# Patient Record
Sex: FEMALE | Race: AMERICAN INDIAN OR ALASKA NATIVE | NOT HISPANIC OR LATINO | Employment: UNEMPLOYED | URBAN - METROPOLITAN AREA
[De-identification: names, ages, dates, MRNs, and addresses within clinical notes are randomized per-mention and may not be internally consistent; named-entity substitution may affect disease eponyms.]

---
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
## 2020-04-10 ENCOUNTER — HOSPITAL ENCOUNTER (OUTPATIENT)
Dept: ULTRASOUND IMAGING | Facility: CLINIC | Age: 20
Discharge: HOME | End: 2020-04-10
Attending: FAMILY MEDICINE | Admitting: FAMILY MEDICINE
Payer: MEDICAID

## 2020-04-10 DIAGNOSIS — Z34.01 SUPERVISION OF NORMAL FIRST PREGNANCY IN FIRST TRIMESTER: ICD-10-CM

## 2020-04-10 PROCEDURE — 76801 OB US < 14 WKS SINGLE FETUS: CPT

## 2020-05-27 LAB
C TRACH DNA SPEC QL PROBE+SIG AMP: NORMAL
CULTURE MICRO: NO GROWTH
HBV SURFACE AG SERPL QL IA: NORMAL
HIV 1+2 AB+HIV1 P24 AG SERPL QL IA: NORMAL
N GONORRHOEA DNA SPEC QL PROBE+SIG AMP: NOT DETECTED
RUBELLA ABY IGG: NORMAL
RUBELLA ANTIBODY IGG QUANTITATIVE: <0.9 IU/ML
TREPONEMA ANTIBODIES: NORMAL

## 2020-07-21 ENCOUNTER — HOSPITAL ENCOUNTER (OUTPATIENT)
Dept: ULTRASOUND IMAGING | Facility: CLINIC | Age: 20
Discharge: HOME | End: 2020-07-21
Attending: FAMILY MEDICINE | Admitting: FAMILY MEDICINE
Payer: COMMERCIAL

## 2020-07-21 DIAGNOSIS — O09.92 SUPERVISION OF HIGH-RISK PREGNANCY, SECOND TRIMESTER: ICD-10-CM

## 2020-07-21 PROCEDURE — 76805 OB US >/= 14 WKS SNGL FETUS: CPT

## 2020-08-14 ENCOUNTER — PRE VISIT (OUTPATIENT)
Dept: MATERNAL FETAL MEDICINE | Facility: CLINIC | Age: 20
End: 2020-08-14

## 2020-08-18 ENCOUNTER — OFFICE VISIT (OUTPATIENT)
Dept: MATERNAL FETAL MEDICINE | Facility: CLINIC | Age: 20
End: 2020-08-18
Attending: FAMILY MEDICINE
Payer: COMMERCIAL

## 2020-08-18 ENCOUNTER — HOSPITAL ENCOUNTER (OUTPATIENT)
Dept: ULTRASOUND IMAGING | Facility: CLINIC | Age: 20
End: 2020-08-18
Attending: FAMILY MEDICINE
Payer: COMMERCIAL

## 2020-08-18 DIAGNOSIS — O35.9XX0 SUSPECTED FETAL ANOMALY, ANTEPARTUM, SINGLE OR UNSPECIFIED FETUS: Primary | ICD-10-CM

## 2020-08-18 DIAGNOSIS — O26.90 PREGNANCY RELATED CONDITION, ANTEPARTUM: ICD-10-CM

## 2020-08-18 PROCEDURE — 76811 OB US DETAILED SNGL FETUS: CPT

## 2020-08-18 NOTE — PROGRESS NOTES
Please see ultrasound report under Imaging tab for details of today's ultrasound.    Romi Ceballos MD  Maternal-Fetal Medicine

## 2020-09-16 ENCOUNTER — HOSPITAL ENCOUNTER (OUTPATIENT)
Dept: ULTRASOUND IMAGING | Facility: CLINIC | Age: 20
End: 2020-09-16
Attending: OBSTETRICS & GYNECOLOGY
Payer: COMMERCIAL

## 2020-09-16 ENCOUNTER — OFFICE VISIT (OUTPATIENT)
Dept: MATERNAL FETAL MEDICINE | Facility: CLINIC | Age: 20
End: 2020-09-16
Attending: OBSTETRICS & GYNECOLOGY
Payer: COMMERCIAL

## 2020-09-16 ENCOUNTER — DOCUMENTATION ONLY (OUTPATIENT)
Dept: OTHER | Facility: CLINIC | Age: 20
End: 2020-09-16

## 2020-09-16 DIAGNOSIS — O35.9XX0 SUSPECTED FETAL ANOMALY, ANTEPARTUM, SINGLE OR UNSPECIFIED FETUS: ICD-10-CM

## 2020-09-16 DIAGNOSIS — O99.322 DRUG USE AFFECTING PREGNANCY IN SECOND TRIMESTER: Primary | ICD-10-CM

## 2020-09-16 PROCEDURE — 76816 OB US FOLLOW-UP PER FETUS: CPT

## 2020-09-16 NOTE — PROGRESS NOTES
"Please see \"Imaging\" tab under \"Chart Review\" for details of today's visit.    Elizabeth Nation    "

## 2020-10-07 ENCOUNTER — NURSE TRIAGE (OUTPATIENT)
Dept: NURSING | Facility: CLINIC | Age: 20
End: 2020-10-07

## 2020-10-07 ENCOUNTER — TRANSFERRED RECORDS (OUTPATIENT)
Dept: HEALTH INFORMATION MANAGEMENT | Facility: CLINIC | Age: 20
End: 2020-10-07

## 2020-10-07 NOTE — TELEPHONE ENCOUNTER
Vicenta from [facility] reports pt is 32w gest. UMM 11/24/20. Pt is on Percocet daily and would like to switch to Suboxone. Pt needs to be monitored for induction. Dr. Daxa Power at [facility] is not comfortable doing induction. Requesting provider from [facility] advise.     Deandre Yadav message will be sent to provider. Call back if new concerns arise.     Reason for Disposition    [1] Pregnant AND [2] admits to substance abuse problem    Protocols used: SUBSTANCE ABUSE AND [id number]

## 2020-10-14 ENCOUNTER — OFFICE VISIT (OUTPATIENT)
Dept: MATERNAL FETAL MEDICINE | Facility: CLINIC | Age: 20
End: 2020-10-14
Attending: OBSTETRICS & GYNECOLOGY
Payer: COMMERCIAL

## 2020-10-14 ENCOUNTER — HOSPITAL ENCOUNTER (OUTPATIENT)
Dept: ULTRASOUND IMAGING | Facility: CLINIC | Age: 20
End: 2020-10-14
Attending: OBSTETRICS & GYNECOLOGY
Payer: COMMERCIAL

## 2020-10-14 DIAGNOSIS — O99.322 DRUG USE AFFECTING PREGNANCY IN SECOND TRIMESTER: ICD-10-CM

## 2020-10-14 DIAGNOSIS — O99.323 DRUG USE AFFECTING PREGNANCY IN THIRD TRIMESTER: Primary | ICD-10-CM

## 2020-10-14 PROCEDURE — 76816 OB US FOLLOW-UP PER FETUS: CPT | Mod: 26 | Performed by: OBSTETRICS & GYNECOLOGY

## 2020-10-14 PROCEDURE — 76816 OB US FOLLOW-UP PER FETUS: CPT

## 2020-10-14 NOTE — PROGRESS NOTES
"Please see \"Imaging\" tab under \"Chart Review\" for details of today's US.    Sarah Sagastume, DO  " EXAM note      History    Joyce Smallwood is a 43 year old female who presents with   Chief Complaint   Patient presents with   • Gastro-intestinal Problem        I have reviewed the past medical, family and social history sections including the medications and allergies listed in the above medical record as well as the nursing notes.     HPI:  Patient with 3 day history of nausea with diarrhea.  The most bowel movement she had an 1 day was 3, only had 1 yesterday and today.  No blood in it.  Falls apart when it hits the toilet bowl water, there is no substance to it.  Also complains of some abdominal bloating.  Denies vomiting.  Has not taken any antidiarrheals.    Review of systems    Review of systems as per HPI.        Physical Exam    Vital Signs:    Vitals:    06/02/20 1358   BP: (!) 140/82   Pulse: 82   Resp: 16   Temp: 98.8 °F (37.1 °C)   TempSrc: Temporal   SpO2: 99%   Weight: 89.1 kg   Height: 5' 2\" (1.575 m)   PainSc:  0       General:  Looks well.  Heart:  Regular rate and rhythm without murmurs.  Lungs:  Clear to auscultation.  Abdomen:  Mildly hyperactive bowel sounds.  No hepatosplenomegaly or masses.  No rebound or guarding.     Imaging:  None    Labs:  None    Assessment  Encounter Diagnoses   Name Primary?   • Diarrhea, unspecified type Yes       PLAN    Patient to get Kaopectate or Pepto-Bismol and use as needed.  If this does not work she can try Imodium.  May return to work tomorrow, excuse given.  Follow-up as needed.

## 2020-10-30 ENCOUNTER — HOSPITAL ENCOUNTER (OUTPATIENT)
Dept: ULTRASOUND IMAGING | Facility: CLINIC | Age: 20
End: 2020-10-30
Attending: OBSTETRICS & GYNECOLOGY
Payer: COMMERCIAL

## 2020-10-30 ENCOUNTER — OFFICE VISIT (OUTPATIENT)
Dept: MATERNAL FETAL MEDICINE | Facility: CLINIC | Age: 20
End: 2020-10-30
Attending: OBSTETRICS & GYNECOLOGY
Payer: COMMERCIAL

## 2020-10-30 DIAGNOSIS — O99.323 DRUG USE AFFECTING PREGNANCY IN THIRD TRIMESTER: Primary | ICD-10-CM

## 2020-10-30 DIAGNOSIS — O99.323 DRUG USE AFFECTING PREGNANCY IN THIRD TRIMESTER: ICD-10-CM

## 2020-10-30 PROCEDURE — 99207 PR NO CHARGE LOS: CPT | Performed by: OBSTETRICS & GYNECOLOGY

## 2020-10-30 PROCEDURE — 76819 FETAL BIOPHYS PROFIL W/O NST: CPT

## 2020-10-30 PROCEDURE — 76819 FETAL BIOPHYS PROFIL W/O NST: CPT | Mod: 26 | Performed by: OBSTETRICS & GYNECOLOGY

## 2020-10-30 NOTE — PROGRESS NOTES
"Please see \"Imaging\" tab under \"Chart Review\" for details of today's US.    Sarah Sagastume, DO    "

## 2020-11-11 ENCOUNTER — HOSPITAL ENCOUNTER (OUTPATIENT)
Dept: ULTRASOUND IMAGING | Facility: CLINIC | Age: 20
End: 2020-11-11
Attending: OBSTETRICS & GYNECOLOGY
Payer: COMMERCIAL

## 2020-11-11 ENCOUNTER — OFFICE VISIT (OUTPATIENT)
Dept: MATERNAL FETAL MEDICINE | Facility: CLINIC | Age: 20
End: 2020-11-11
Attending: OBSTETRICS & GYNECOLOGY
Payer: COMMERCIAL

## 2020-11-11 VITALS — DIASTOLIC BLOOD PRESSURE: 74 MMHG | SYSTOLIC BLOOD PRESSURE: 124 MMHG

## 2020-11-11 DIAGNOSIS — O26.90 PREGNANCY RELATED CONDITION, ANTEPARTUM: ICD-10-CM

## 2020-11-11 DIAGNOSIS — O99.323 DRUG USE AFFECTING PREGNANCY IN THIRD TRIMESTER: ICD-10-CM

## 2020-11-11 DIAGNOSIS — O99.323 DRUG USE AFFECTING PREGNANCY IN THIRD TRIMESTER: Primary | ICD-10-CM

## 2020-11-11 LAB
ERYTHROCYTE [DISTWIDTH] IN BLOOD BY AUTOMATED COUNT: 15.8 % (ref 10–15)
HCT VFR BLD AUTO: 35.9 % (ref 35–47)
HGB BLD-MCNC: 11.7 G/DL (ref 11.7–15.7)
MCH RBC QN AUTO: 27.9 PG (ref 26.5–33)
MCHC RBC AUTO-ENTMCNC: 32.6 G/DL (ref 31.5–36.5)
MCV RBC AUTO: 86 FL (ref 78–100)
PLATELET # BLD AUTO: 241 10E9/L (ref 150–450)
RBC # BLD AUTO: 4.19 10E12/L (ref 3.8–5.2)
WBC # BLD AUTO: 10.9 10E9/L (ref 4–11)

## 2020-11-11 PROCEDURE — 84450 TRANSFERASE (AST) (SGOT): CPT | Performed by: OBSTETRICS & GYNECOLOGY

## 2020-11-11 PROCEDURE — 76816 OB US FOLLOW-UP PER FETUS: CPT | Mod: 26 | Performed by: OBSTETRICS & GYNECOLOGY

## 2020-11-11 PROCEDURE — 76819 FETAL BIOPHYS PROFIL W/O NST: CPT

## 2020-11-11 PROCEDURE — 76819 FETAL BIOPHYS PROFIL W/O NST: CPT | Mod: 26 | Performed by: OBSTETRICS & GYNECOLOGY

## 2020-11-11 PROCEDURE — 36415 COLL VENOUS BLD VENIPUNCTURE: CPT | Performed by: OBSTETRICS & GYNECOLOGY

## 2020-11-11 PROCEDURE — 84156 ASSAY OF PROTEIN URINE: CPT | Performed by: OBSTETRICS & GYNECOLOGY

## 2020-11-11 PROCEDURE — 82565 ASSAY OF CREATININE: CPT | Performed by: OBSTETRICS & GYNECOLOGY

## 2020-11-11 PROCEDURE — 84460 ALANINE AMINO (ALT) (SGPT): CPT | Performed by: OBSTETRICS & GYNECOLOGY

## 2020-11-11 PROCEDURE — 85027 COMPLETE CBC AUTOMATED: CPT | Performed by: OBSTETRICS & GYNECOLOGY

## 2020-11-11 NOTE — NURSING NOTE
Blood pressure checked per Dr. Kraus's request. 124/74.  Patient sent up to 7th floor for labs.  Patient instructed on signs and symptoms of preeclampsia and when to call her primary ob provider. Patient verbalized understanding of these instructions.

## 2020-11-11 NOTE — PROGRESS NOTES
Please see ultrasound report under imaging tab for details on ultrasound performed today.    Jessica Kraus MD  , OB/GYN  Maternal-Fetal Medicine  [email or web address]  [phone] (Academic office)  [phone] (Pager)

## 2020-11-12 LAB
ALT SERPL W P-5'-P-CCNC: 10 U/L (ref 0–50)
AST SERPL W P-5'-P-CCNC: 9 U/L (ref 0–45)
CREAT SERPL-MCNC: 0.48 MG/DL (ref 0.52–1.04)
CREAT UR-MCNC: 184 MG/DL
GFR SERPL CREATININE-BSD FRML MDRD: >90 ML/MIN/{1.73_M2}
PROT UR-MCNC: 0.46 G/L
PROT/CREAT 24H UR: 0.25 G/G CR (ref 0–0.2)

## 2020-11-18 ENCOUNTER — HOSPITAL ENCOUNTER (INPATIENT)
Facility: CLINIC | Age: 20
LOS: 3 days | Discharge: HOME HEALTH | End: 2020-11-21
Attending: ADVANCED PRACTICE MIDWIFE | Admitting: ADVANCED PRACTICE MIDWIFE
Payer: COMMERCIAL

## 2020-11-18 DIAGNOSIS — D62 ANEMIA DUE TO BLOOD LOSS, ACUTE: ICD-10-CM

## 2020-11-18 PROBLEM — O42.90 LEAKAGE OF AMNIOTIC FLUID: Status: ACTIVE | Noted: 2020-11-18

## 2020-11-18 PROBLEM — Z36.89 ENCOUNTER FOR TRIAGE IN PREGNANT PATIENT: Status: ACTIVE | Noted: 2020-11-18

## 2020-11-18 LAB
ABO + RH BLD: NORMAL
ABO + RH BLD: NORMAL
AMPHETAMINES UR QL SCN: NEGATIVE
BASOPHILS # BLD AUTO: 0 10E9/L (ref 0–0.2)
BASOPHILS NFR BLD AUTO: 0.3 %
BLD GP AB SCN SERPL QL: NORMAL
BLOOD BANK CMNT PATIENT-IMP: NORMAL
CANNABINOIDS UR QL: NEGATIVE
COCAINE UR QL: NEGATIVE
DIFFERENTIAL METHOD BLD: ABNORMAL
EOSINOPHIL # BLD AUTO: 0.1 10E9/L (ref 0–0.7)
EOSINOPHIL NFR BLD AUTO: 0.7 %
ERYTHROCYTE [DISTWIDTH] IN BLOOD BY AUTOMATED COUNT: 15.3 % (ref 10–15)
HCT VFR BLD AUTO: 37.8 % (ref 35–47)
HGB BLD-MCNC: 12.1 G/DL (ref 11.7–15.7)
IMM GRANULOCYTES # BLD: 0.1 10E9/L (ref 0–0.4)
IMM GRANULOCYTES NFR BLD: 0.6 %
LABORATORY COMMENT REPORT: NORMAL
LYMPHOCYTES # BLD AUTO: 1.9 10E9/L (ref 0.8–5.3)
LYMPHOCYTES NFR BLD AUTO: 19 %
MCH RBC QN AUTO: 27.6 PG (ref 26.5–33)
MCHC RBC AUTO-ENTMCNC: 32 G/DL (ref 31.5–36.5)
MCV RBC AUTO: 86 FL (ref 78–100)
MONOCYTES # BLD AUTO: 0.4 10E9/L (ref 0–1.3)
MONOCYTES NFR BLD AUTO: 4.1 %
NEUTROPHILS # BLD AUTO: 7.4 10E9/L (ref 1.6–8.3)
NEUTROPHILS NFR BLD AUTO: 75.3 %
NRBC # BLD AUTO: 0 10*3/UL
NRBC BLD AUTO-RTO: 0 /100
OPIATES UR QL SCN: NEGATIVE
PCP UR QL SCN: NEGATIVE
PLATELET # BLD AUTO: 233 10E9/L (ref 150–450)
RBC # BLD AUTO: 4.38 10E12/L (ref 3.8–5.2)
RUPTURE OF FETAL MEMBRANES BY ROM PLUS: POSITIVE
SARS-COV-2 RNA SPEC QL NAA+PROBE: NEGATIVE
SARS-COV-2 RNA SPEC QL NAA+PROBE: NORMAL
SPECIMEN EXP DATE BLD: NORMAL
SPECIMEN SOURCE: NORMAL
SPECIMEN SOURCE: NORMAL
WBC # BLD AUTO: 9.8 10E9/L (ref 4–11)

## 2020-11-18 PROCEDURE — G0463 HOSPITAL OUTPT CLINIC VISIT: HCPCS

## 2020-11-18 PROCEDURE — 36415 COLL VENOUS BLD VENIPUNCTURE: CPT | Performed by: ADVANCED PRACTICE MIDWIFE

## 2020-11-18 PROCEDURE — 250N000013 HC RX MED GY IP 250 OP 250 PS 637

## 2020-11-18 PROCEDURE — 85025 COMPLETE CBC W/AUTO DIFF WBC: CPT | Performed by: ADVANCED PRACTICE MIDWIFE

## 2020-11-18 PROCEDURE — 86780 TREPONEMA PALLIDUM: CPT | Performed by: ADVANCED PRACTICE MIDWIFE

## 2020-11-18 PROCEDURE — 86901 BLOOD TYPING SEROLOGIC RH(D): CPT | Performed by: ADVANCED PRACTICE MIDWIFE

## 2020-11-18 PROCEDURE — 250N000011 HC RX IP 250 OP 636: Performed by: ADVANCED PRACTICE MIDWIFE

## 2020-11-18 PROCEDURE — U0003 INFECTIOUS AGENT DETECTION BY NUCLEIC ACID (DNA OR RNA); SEVERE ACUTE RESPIRATORY SYNDROME CORONAVIRUS 2 (SARS-COV-2) (CORONAVIRUS DISEASE [COVID-19]), AMPLIFIED PROBE TECHNIQUE, MAKING USE OF HIGH THROUGHPUT TECHNOLOGIES AS DESCRIBED BY CMS-2020-01-R: HCPCS | Performed by: ADVANCED PRACTICE MIDWIFE

## 2020-11-18 PROCEDURE — 250N000013 HC RX MED GY IP 250 OP 250 PS 637: Performed by: ADVANCED PRACTICE MIDWIFE

## 2020-11-18 PROCEDURE — 86900 BLOOD TYPING SEROLOGIC ABO: CPT | Performed by: ADVANCED PRACTICE MIDWIFE

## 2020-11-18 PROCEDURE — 84112 EVAL AMNIOTIC FLUID PROTEIN: CPT | Performed by: OBSTETRICS & GYNECOLOGY

## 2020-11-18 PROCEDURE — 120N000002 HC R&B MED SURG/OB UMMC

## 2020-11-18 PROCEDURE — 87653 STREP B DNA AMP PROBE: CPT | Performed by: ADVANCED PRACTICE MIDWIFE

## 2020-11-18 PROCEDURE — 86850 RBC ANTIBODY SCREEN: CPT | Performed by: ADVANCED PRACTICE MIDWIFE

## 2020-11-18 PROCEDURE — 80307 DRUG TEST PRSMV CHEM ANLYZR: CPT | Performed by: ADVANCED PRACTICE MIDWIFE

## 2020-11-18 PROCEDURE — 258N000003 HC RX IP 258 OP 636: Performed by: ADVANCED PRACTICE MIDWIFE

## 2020-11-18 RX ORDER — IBUPROFEN 800 MG/1
800 TABLET, FILM COATED ORAL
Status: DISCONTINUED | OUTPATIENT
Start: 2020-11-18 | End: 2020-11-19

## 2020-11-18 RX ORDER — NALOXONE HYDROCHLORIDE 0.4 MG/ML
0.4 INJECTION, SOLUTION INTRAMUSCULAR; INTRAVENOUS; SUBCUTANEOUS
Status: DISCONTINUED | OUTPATIENT
Start: 2020-11-18 | End: 2020-11-21 | Stop reason: HOSPADM

## 2020-11-18 RX ORDER — PENICILLIN G POTASSIUM 5000000 [IU]/1
5 INJECTION, POWDER, FOR SOLUTION INTRAMUSCULAR; INTRAVENOUS ONCE
Status: COMPLETED | OUTPATIENT
Start: 2020-11-18 | End: 2020-11-18

## 2020-11-18 RX ORDER — ONDANSETRON 2 MG/ML
4 INJECTION INTRAMUSCULAR; INTRAVENOUS EVERY 6 HOURS PRN
Status: DISCONTINUED | OUTPATIENT
Start: 2020-11-18 | End: 2020-11-19

## 2020-11-18 RX ORDER — OXYTOCIN 10 [USP'U]/ML
10 INJECTION, SOLUTION INTRAMUSCULAR; INTRAVENOUS
Status: DISCONTINUED | OUTPATIENT
Start: 2020-11-18 | End: 2020-11-19

## 2020-11-18 RX ORDER — MISOPROSTOL 100 UG/1
25 TABLET ORAL
Status: DISCONTINUED | OUTPATIENT
Start: 2020-11-18 | End: 2020-11-19

## 2020-11-18 RX ORDER — CARBOPROST TROMETHAMINE 250 UG/ML
250 INJECTION, SOLUTION INTRAMUSCULAR
Status: DISCONTINUED | OUTPATIENT
Start: 2020-11-18 | End: 2020-11-19

## 2020-11-18 RX ORDER — FENTANYL CITRATE 50 UG/ML
50-100 INJECTION, SOLUTION INTRAMUSCULAR; INTRAVENOUS
Status: DISCONTINUED | OUTPATIENT
Start: 2020-11-18 | End: 2020-11-19

## 2020-11-18 RX ORDER — METHYLERGONOVINE MALEATE 0.2 MG/ML
200 INJECTION INTRAVENOUS
Status: DISCONTINUED | OUTPATIENT
Start: 2020-11-18 | End: 2020-11-19

## 2020-11-18 RX ORDER — ACETAMINOPHEN 325 MG/1
650 TABLET ORAL EVERY 4 HOURS PRN
Status: DISCONTINUED | OUTPATIENT
Start: 2020-11-18 | End: 2020-11-19

## 2020-11-18 RX ORDER — NALOXONE HYDROCHLORIDE 0.4 MG/ML
0.2 INJECTION, SOLUTION INTRAMUSCULAR; INTRAVENOUS; SUBCUTANEOUS
Status: DISCONTINUED | OUTPATIENT
Start: 2020-11-18 | End: 2020-11-21 | Stop reason: HOSPADM

## 2020-11-18 RX ORDER — OXYTOCIN/0.9 % SODIUM CHLORIDE 30/500 ML
100-340 PLASTIC BAG, INJECTION (ML) INTRAVENOUS CONTINUOUS PRN
Status: COMPLETED | OUTPATIENT
Start: 2020-11-18 | End: 2020-11-19

## 2020-11-18 RX ORDER — CITRIC ACID/SODIUM CITRATE 334-500MG
30 SOLUTION, ORAL ORAL ONCE
Status: DISCONTINUED | OUTPATIENT
Start: 2020-11-18 | End: 2020-11-19

## 2020-11-18 RX ORDER — MISOPROSTOL 200 UG/1
TABLET ORAL
Status: COMPLETED
Start: 2020-11-18 | End: 2020-11-18

## 2020-11-18 RX ORDER — HYDROXYZINE HYDROCHLORIDE 50 MG/1
50-100 TABLET, FILM COATED ORAL
Status: DISCONTINUED | OUTPATIENT
Start: 2020-11-18 | End: 2020-11-19

## 2020-11-18 RX ORDER — NALOXONE HYDROCHLORIDE 0.4 MG/ML
.1-.4 INJECTION, SOLUTION INTRAMUSCULAR; INTRAVENOUS; SUBCUTANEOUS
Status: DISCONTINUED | OUTPATIENT
Start: 2020-11-18 | End: 2020-11-18

## 2020-11-18 RX ORDER — OXYCODONE AND ACETAMINOPHEN 5; 325 MG/1; MG/1
1 TABLET ORAL
Status: DISCONTINUED | OUTPATIENT
Start: 2020-11-18 | End: 2020-11-19

## 2020-11-18 RX ORDER — LIDOCAINE 40 MG/G
CREAM TOPICAL
Status: DISCONTINUED | OUTPATIENT
Start: 2020-11-18 | End: 2020-11-19

## 2020-11-18 RX ORDER — HYDROXYZINE HYDROCHLORIDE 50 MG/1
100 TABLET, FILM COATED ORAL EVERY 6 HOURS PRN
Status: DISCONTINUED | OUTPATIENT
Start: 2020-11-18 | End: 2020-11-19

## 2020-11-18 RX ORDER — LIDOCAINE HYDROCHLORIDE 10 MG/ML
INJECTION, SOLUTION EPIDURAL; INFILTRATION; INTRACAUDAL; PERINEURAL
Status: DISCONTINUED
Start: 2020-11-18 | End: 2020-11-19 | Stop reason: WASHOUT

## 2020-11-18 RX ADMIN — Medication 2.5 MILLION UNITS: at 23:47

## 2020-11-18 RX ADMIN — Medication 25 MCG: at 21:37

## 2020-11-18 RX ADMIN — HYDROXYZINE HYDROCHLORIDE 100 MG: 50 TABLET, FILM COATED ORAL at 21:47

## 2020-11-18 RX ADMIN — Medication 25 MCG: at 15:25

## 2020-11-18 RX ADMIN — PENICILLIN G POTASSIUM 5 MILLION UNITS: 5000000 POWDER, FOR SOLUTION INTRAMUSCULAR; INTRAPLEURAL; INTRATHECAL; INTRAVENOUS at 19:40

## 2020-11-18 RX ADMIN — Medication 25 MCG: at 19:33

## 2020-11-18 RX ADMIN — Medication 25 MCG: at 17:25

## 2020-11-18 ASSESSMENT — ACTIVITIES OF DAILY LIVING (ADL)
TOILETING_ISSUES: NO
FALL_HISTORY_WITHIN_LAST_SIX_MONTHS: YES
NUMBER_OF_TIMES_PATIENT_HAS_FALLEN_WITHIN_LAST_SIX_MONTHS: 1

## 2020-11-18 NOTE — PROVIDER NOTIFICATION
11/18/20 1121   Provider Notification   Provider Name/Title Yulissa Chaves CNM   Method of Notification Electronic Page   ROM+ positive.  Per prenatal, utox is needed. Need covid.  GBS unknown.

## 2020-11-18 NOTE — PROGRESS NOTES
Labor progress note    S: Pt has been coping well, not really feeling much cramping.     O:  Blood pressure 119/60, pulse 85, temperature 98.2  F (36.8  C), temperature source Oral, resp. rate 16, last menstrual period 2020, not currently breastfeeding.  General appearance: comfortable.  Contractions: irregular, mild.  Soft resting tone.   FHT: Baseline 135 with moderate variability. Accelerations are present. no decelerations present.  Periods of loss of contact, EFM readjusted.   ROM: clear fluid. Membranes have been ruptured for ~16 hours.  PELVIC EXAM:deferred  Sanford Score: Total:  with admit SVE    Pitocin- none,  Antibiotics- Plans PCN for GBS unknown at term at ~18 hours ROM  IV saline lock    Utox returned negative        # Pain Assessment:    Polina sol pain level was assessed and she currently denies pain.        A:  20 year old  with IUP @ 39w1d IOL for PROM without labor   ROM <18 hours, clear, afebrile  Fetal Heart Rate Tracing category one  GBS- pending. Plan RX at 18 hours of ROM    Patient Active Problem List   Diagnosis     Major depression     Suicidal ideation     Encounter for triage in pregnant patient     Leakage of amniotic fluid         P:  Ambulation, hydration, position changes, birthing ball/sling and tub options to facilitate labor.   Continue cervical ripening with oral Misoprostol per protocol.  Consider vaginal route prn   Prophylactic IV antibiotic for unknown GBS status  At term, pt agreeable to PCN at 18 hours of ROM.   Reevaluate in 2-4 4 hours and prn    Yulissa JAMIL, CNM
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
## 2020-11-18 NOTE — PROVIDER NOTIFICATION
20 1013   Provider Notification   Provider Name/Title Dr Barton   Method of Notification Electronic Page   Request Evaluate in Person   Notification Reason Patient Arrived    39.1 leaking fluid since 0100- clear per pt.  Not painful or regular ctxs.  No vag bldg. +FM.  Unknown GBS. I will collect ROM+

## 2020-11-18 NOTE — H&P
ADMIT NOTE  =================  39w1d    Polina Barrientos is a 20 year old female with an Patient's last menstrual period was 2020. and Estimated Date of Delivery: 2020 [facility] at 11:40 AM with SROM without labor.     HPI  ================  Pt reports leaking clear fluid since 0100 today.    No fever, cough, SOB or chest pain. No contact with anyone who is Covid-19 positive. Agreeable to Covid-19 testing. She and her partner are wearing masks.   Contractions- none  Fetal movement- active  ROM- yes moderate, clear. SROM @ 0100.  Vaginal bleeding- none  GBS- pending  FOB- is involved, Herve, present and supportive  Other labor support- None given COVID restrictions    Weight gain- 218 - 209 lbs, Total weight gain- 9 lbs *as of prenatal appointment 10/5/20*  Height- 65.2in  BMI- 37  First prenatal visit at 11 weeks (had first dating US at 7 weeks), Total visits- 6    - Partner Herve  - Getting prenatal care at [facility], faxed prenatals.     - Tdap and Flu 10/5/2020   - Plans pap postpartum  - Opioid Use disorder noted in prenatal with on/off use of Suboxone without prescription. Declined initiation   - history of depression with suicide attempts , , Stopped Wellbutrin declined talk therapy  - cigarette smoker  - Had Pre E labs checked 2020 at [facility]. BP was 124/79 but had RUQ pain, no HA or visual changes.  Labs WNL (CBC, AST/ALT, Pr/Cr ratio)  - Having weekly BPP for opioid use disorder/maintenance   - 2020 US at [facility] - Cephalic.  No previa.  39%tile. MVP 5.9cm. BPP     Dating US 4/10/2020 - 7w3d  Anatomy scan 2020 with [facility], BPD smaller than AC so [facility] US ordered.   Had [facility] US on , , 10/14,    Use [facility] by 7w3d US - 2020    PROBLEM LIST  =================  Patient Active Problem List    Diagnosis Date Noted     Encounter for triage in pregnant patient 2020     Priority: Medium     Leakage of  amniotic fluid 2020     Priority: Medium     Suicidal ideation 10/16/2015     Priority: Medium     Major depression 2013     Priority: Medium       HISTORIES  ============  No Known Allergies  Past Medical History:   Diagnosis Date     Asthma     does not use inhaler, dx'd      Depressive disorder     h/o depression and suidide attempt      History reviewed. No pertinent surgical history..  History reviewed. No pertinent family history.  Social History     Tobacco Use     Smoking status: Never Smoker     Smokeless tobacco: Never Used   Substance Use Topics     Alcohol use: Not Currently     Comment: occasionally     OB History    Para Term  AB Living   1 0 0 0 0 0   SAB TAB Ectopic Multiple Live Births   0 0 0 0 0      # Outcome Date GA Lbr Marco Antonio/2nd Weight Sex Delivery Anes PTL Lv   1 Current                 LABS:   ===========  Prenatal Labs:  2020 - HIV NR  2020 - Hep B antibody BORDERLINE  2020 - Hep C NR  2020 - HbsAg NR  2020 - Rubella NON immune   -  O positive, antibody negative  2020- RPR NR  2020 - GC/CT negative  2020 - Urine culture negative  2020 -  Utox positive Oxycodone.   2020 - HgA1c 5  2020 - Hg 13.3, Hct40.9, Plt 237    2020 - Passed 1 hour = 94  2020 - Hg 11.5, hematocrit 36.8, Plt 248    Rhogam not indicated   Lab Results   Component Value Date    ABO O 2020    RH Pos 2020    AS Neg 2020    RUQIGG <0.90 2020    HEPBANG non-reactive 2020    HGB 12.1 2020    HIAGAB non-reactive 2020     Rubella NON immune  GBS - unknown   Other labs:  ROM plus POSITIVE    ROS  =========  Pt denies significant respiratory, cardiovacular, GI, or muscular/skeletalcomplaints.    See RN data base ROS.       PHYSICAL EXAM:  ===============  /68   Pulse 83   Temp 98.1  F (36.7  C) (Oral)   Resp 16   LMP 2020   General appearance: comfortable  GENERAL  APPEARANCE: healthy, alert and no distress  RESP: lungs clear to auscultation - no rales, rhonchi or wheezes  CV: regular rates and rhythm, normal S1 S2, no S3 or S4 and no murmur,and no varicosities  ABDOMEN:  soft, nontender, no epigastric pain  SKIN: no suspicious lesions or rashes  NEURO: Denies headache, blurred vision, other vision changes  PSYCH: mentation appears normal. and affect normal/bright  Legs: No edema     Abdomen: gravid, vertex fetus per Leopold's, non-tender between contractions.   Cephalic presentation confirmed by BSUS  EFW-  7.5 lbs.   CONTRACTIONS: irreg, mild and cramping  FETAL HEART TONES: continuous EFM- baseline 140 with moderate variability and positive accelerations. No decelerations.  PELVIC EXAM: closed/ 30%/ Posterior/ soft/ -3   ESPINOSA SCORE: 2  BLOODY SHOW: no   ROM:no  FLUID: clear  ROMPLUS: positive    # Pain Assessment:  Current Pain Score 2020   Patient currently in pain? denies   Pain score (0-10) -   Pain location -   Pain descriptors -   Polina sol pain level was assessed and she currently denies pain.       ASSESSMENT:  ==============  20 year old  with IUP @ 39w1d admitted with PROM without labor   NST REACTIVE  Fetal Heart Tones - category one  GBS- pending  Covid- pending    Patient Active Problem List   Diagnosis     Major depression     Suicidal ideation     Encounter for triage in pregnant patient     Leakage of amniotic fluid       PLAN:  ===========  - Admit - see IP orders  - Discussed GBS unknown status, GBS specimen obtained on admission and is pending at this time.  Risks and benefits to antibiotics discussed.  Patient desires to initiate treatment 18 hours after rupture at 1900 this evening.  - Discussed active vs expected management of labor, risk of infection increases after 24 hours ROM.  Discussed minimizing cervical exams to decrease risk of infection.  Patient desires expectant management until 12 hours ROM at 1300.  Is amenable to SVE and to  discuss active management at that time.  Discussed risks and benefits to cervical ripening options including misoprostol, lozano balloon, and pitocin.  Patient consents to IV placement.  Desires to ambulate in room and use birthing ball at this time, discussed nipple stimulation to promote oxytocin release.  Will revisit at 1300.  - Pain medication options reviewed including nitrous oxide (pending negative COVID test), fentayl, and epidural. Pt is interested in natural labor, declines pain medications at this time.      I, Jessica Yan, am serving as a scribe; to document services personally performed by  OMERO Blair, KEVIN based on data collection and the provider's statements to me.     Jessica Yan, RN, SNM    The encounter was performed by me and scribed by the SNM. The scribed note accurately reflects my personal services and decisions made by me. OMERO Dacosta CNM      ADDENDUM 11/18/2020 at 1500  S - Pt feeling some cramping but nothing painful.  Agreeable to Utox, will leave sample with void before SVE.  Had pt alone, confirmed feels supported and safe with partner Herve.  Reviewed again risk/benefit of SVE given ROM without labor.  Pt desires SVE for plan of care.    O -  /68   Pulse 83   Temp 98.1  F (36.7  C) (Oral)   Resp 16   LMP 01/16/2020    FHR: 125bpm baseline, moderate variability, +Accels, No Decels.  Periods of loss of contact   TOCO: occasional mild contractions.  Soft resting tone  Cephalic confirmed by BSUS in transverse abdominal view  SVE 0/30/-3, posterior, soft  Sanford score 2    IV saline lock    Results for orders placed or performed during the hospital encounter of 11/18/20   Asymptomatic COVID-19 Virus (Coronavirus) by PCR     Status: None    Specimen: Nasopharyngeal   Result Value Ref Range    COVID-19 Virus PCR to [facility] - Source Nasopharyngeal     COVID-19 Virus PCR to [facility] - Result       Test received-See reflex to IDDL test SARS CoV2 (COVID-19) Virus  RT-PCR   CBC with platelets differential     Status: Abnormal   Result Value Ref Range    WBC 9.8 4.0 - 11.0 10e9/L    RBC Count 4.38 3.8 - 5.2 10e12/L    Hemoglobin 12.1 11.7 - 15.7 g/dL    Hematocrit 37.8 35.0 - 47.0 %    MCV 86 78 - 100 fl    MCH 27.6 26.5 - 33.0 pg    MCHC 32.0 31.5 - 36.5 g/dL    RDW 15.3 (H) 10.0 - 15.0 %    Platelet Count 233 150 - 450 10e9/L    Diff Method Automated Method     % Neutrophils 75.3 %    % Lymphocytes 19.0 %    % Monocytes 4.1 %    % Eosinophils 0.7 %    % Basophils 0.3 %    % Immature Granulocytes 0.6 %    Nucleated RBCs 0 0 /100    Absolute Neutrophil 7.4 1.6 - 8.3 10e9/L    Absolute Lymphocytes 1.9 0.8 - 5.3 10e9/L    Absolute Monocytes 0.4 0.0 - 1.3 10e9/L    Absolute Eosinophils 0.1 0.0 - 0.7 10e9/L    Absolute Basophils 0.0 0.0 - 0.2 10e9/L    Abs Immature Granulocytes 0.1 0 - 0.4 10e9/L    Absolute Nucleated RBC 0.0    Drug Screen Urine /     Status: None   Result Value Ref Range    Amphetamine Qual Urine Negative NEG^Negative    Cannabinoids Qual Urine Negative NEG^Negative    Cocaine Qual Urine Negative NEG^Negative    Opiates Qualitative Urine Negative NEG^Negative    Pcp Qual Urine Negative NEG^Negative   SARS-CoV-2 COVID-19 Virus (Coronavirus) RT-PCR Nasopharyngeal     Status: None    Specimen: Nasopharyngeal   Result Value Ref Range    SARS-CoV-2 Virus Specimen Source Nasopharyngeal     SARS-CoV-2 PCR Result NEGATIVE     SARS-CoV-2 PCR Comment       Testing was performed using the Xpert Xpress SARS-CoV-2 Assay on the Cepheid Gene-Xpert   Instrument Systems. Additional information about this Emergency Use Authorization (EUA)   assay can be found via the Lab Guide.     Hepatitis B surface antigen     Status: None   Result Value Ref Range    Hep B Surface Agn non-reactive    Treponema Abs w Reflex to RPR and Titer     Status: None   Result Value Ref Range    Treponema Antibodies RPR non-reactive    HIV Antigen Antibody Combo     Status: None   Result  Value Ref Range    HIV Antigen Antibody Combo non-reactive    Rubella Antibody IgG Quantitative     Status: None   Result Value Ref Range    Rubella KAYLIN IgG not immune     Rubella Antibody IgG Quantitative <0.90 IU/mL   ABO/Rh type and screen     Status: None   Result Value Ref Range    ABO O     RH(D) Pos     Antibody Screen Neg     Test Valid Only At          [facility]    Specimen Expires 2020    Rupture of Fetal Membranes by ROM Plus     Status: Abnormal   Result Value Ref Range    Rupture of Fetal Membranes by ROM Plus Positive (A) NEG^Negative   Urine Culture Aerobic Bacterial     Status: None    Specimen: Urine   Result Value Ref Range    Culture Micro no growth    Chlamydia trachomatis PCR     Status: None   Result Value Ref Range    Chlamydia Trachomatis PCR not dectected    Neisseria gonorrhoeae PCR     Status: None   Result Value Ref Range    N Gonorrhea PCR not detected            A: 20 year old  at 39w1d  PROM without labor <18 hours  GBS unknown    P : Continue plan as above.   Cervical ripening options again reivewed with patient including risk/benefit.  Pt prefers to proceed with oral Misoprostol.   Utox in process   Reassess 4 hours and prn.      I, Jessica Yan, am serving as a scribe; to document services personally performed by  OMERO Blair, KEVIN based on data collection and the provider's statements to me.     Jessica Yan, RN, SNM    The encounter was performed by me and scribed by the SNM. The scribed note accurately reflects my personal services and decisions made by me. OMERO Dacosta CNM

## 2020-11-19 ENCOUNTER — ANESTHESIA EVENT (OUTPATIENT)
Dept: OBGYN | Facility: CLINIC | Age: 20
End: 2020-11-19
Payer: COMMERCIAL

## 2020-11-19 ENCOUNTER — ANESTHESIA (OUTPATIENT)
Dept: OBGYN | Facility: CLINIC | Age: 20
End: 2020-11-19
Payer: COMMERCIAL

## 2020-11-19 LAB
GP B STREP DNA SPEC QL NAA+PROBE: NEGATIVE
SPECIMEN SOURCE: NORMAL
T PALLIDUM AB SER QL: NONREACTIVE

## 2020-11-19 PROCEDURE — 250N000009 HC RX 250: Performed by: ADVANCED PRACTICE MIDWIFE

## 2020-11-19 PROCEDURE — 258N000003 HC RX IP 258 OP 636

## 2020-11-19 PROCEDURE — 250N000013 HC RX MED GY IP 250 OP 250 PS 637: Performed by: ADVANCED PRACTICE MIDWIFE

## 2020-11-19 PROCEDURE — 88307 TISSUE EXAM BY PATHOLOGIST: CPT | Mod: TC | Performed by: ADVANCED PRACTICE MIDWIFE

## 2020-11-19 PROCEDURE — 59409 OBSTETRICAL CARE: CPT | Performed by: ADVANCED PRACTICE MIDWIFE

## 2020-11-19 PROCEDURE — 250N000011 HC RX IP 250 OP 636: Performed by: STUDENT IN AN ORGANIZED HEALTH CARE EDUCATION/TRAINING PROGRAM

## 2020-11-19 PROCEDURE — 88307 TISSUE EXAM BY PATHOLOGIST: CPT | Mod: 26 | Performed by: PATHOLOGY

## 2020-11-19 PROCEDURE — 10S0XZZ REPOSITION PRODUCTS OF CONCEPTION, EXTERNAL APPROACH: ICD-10-PCS | Performed by: ADVANCED PRACTICE MIDWIFE

## 2020-11-19 PROCEDURE — 3E0R3BZ INTRODUCTION OF ANESTHETIC AGENT INTO SPINAL CANAL, PERCUTANEOUS APPROACH: ICD-10-PCS | Performed by: ANESTHESIOLOGY

## 2020-11-19 PROCEDURE — 722N000001 HC LABOR CARE VAGINAL DELIVERY SINGLE

## 2020-11-19 PROCEDURE — 120N000002 HC R&B MED SURG/OB UMMC

## 2020-11-19 PROCEDURE — 258N000003 HC RX IP 258 OP 636: Performed by: STUDENT IN AN ORGANIZED HEALTH CARE EDUCATION/TRAINING PROGRAM

## 2020-11-19 PROCEDURE — 250N000009 HC RX 250: Performed by: STUDENT IN AN ORGANIZED HEALTH CARE EDUCATION/TRAINING PROGRAM

## 2020-11-19 PROCEDURE — 250N000011 HC RX IP 250 OP 636: Performed by: ADVANCED PRACTICE MIDWIFE

## 2020-11-19 PROCEDURE — 00HU33Z INSERTION OF INFUSION DEVICE INTO SPINAL CANAL, PERCUTANEOUS APPROACH: ICD-10-PCS | Performed by: ANESTHESIOLOGY

## 2020-11-19 PROCEDURE — 258N000003 HC RX IP 258 OP 636: Performed by: ADVANCED PRACTICE MIDWIFE

## 2020-11-19 RX ORDER — AMOXICILLIN 250 MG
1 CAPSULE ORAL 2 TIMES DAILY
Status: DISCONTINUED | OUTPATIENT
Start: 2020-11-19 | End: 2020-11-21 | Stop reason: HOSPADM

## 2020-11-19 RX ORDER — LIDOCAINE HYDROCHLORIDE AND EPINEPHRINE 15; 5 MG/ML; UG/ML
INJECTION, SOLUTION EPIDURAL PRN
Status: DISCONTINUED | OUTPATIENT
Start: 2020-11-19 | End: 2020-11-21 | Stop reason: HOSPADM

## 2020-11-19 RX ORDER — MODIFIED LANOLIN
OINTMENT (GRAM) TOPICAL
Status: DISCONTINUED | OUTPATIENT
Start: 2020-11-19 | End: 2020-11-21 | Stop reason: HOSPADM

## 2020-11-19 RX ORDER — IBUPROFEN 800 MG/1
800 TABLET, FILM COATED ORAL EVERY 6 HOURS PRN
Status: DISCONTINUED | OUTPATIENT
Start: 2020-11-19 | End: 2020-11-21 | Stop reason: HOSPADM

## 2020-11-19 RX ORDER — OXYTOCIN/0.9 % SODIUM CHLORIDE 30/500 ML
1-24 PLASTIC BAG, INJECTION (ML) INTRAVENOUS CONTINUOUS
Status: DISCONTINUED | OUTPATIENT
Start: 2020-11-19 | End: 2020-11-19

## 2020-11-19 RX ORDER — SODIUM CHLORIDE, SODIUM LACTATE, POTASSIUM CHLORIDE, CALCIUM CHLORIDE 600; 310; 30; 20 MG/100ML; MG/100ML; MG/100ML; MG/100ML
INJECTION, SOLUTION INTRAVENOUS CONTINUOUS
Status: DISCONTINUED | OUTPATIENT
Start: 2020-11-19 | End: 2020-11-19

## 2020-11-19 RX ORDER — OXYTOCIN 10 [USP'U]/ML
10 INJECTION, SOLUTION INTRAMUSCULAR; INTRAVENOUS
Status: DISCONTINUED | OUTPATIENT
Start: 2020-11-19 | End: 2020-11-21 | Stop reason: HOSPADM

## 2020-11-19 RX ORDER — HYDROCORTISONE 2.5 %
CREAM (GRAM) TOPICAL 3 TIMES DAILY PRN
Status: DISCONTINUED | OUTPATIENT
Start: 2020-11-19 | End: 2020-11-21 | Stop reason: HOSPADM

## 2020-11-19 RX ORDER — BISACODYL 10 MG
10 SUPPOSITORY, RECTAL RECTAL DAILY PRN
Status: DISCONTINUED | OUTPATIENT
Start: 2020-11-21 | End: 2020-11-21 | Stop reason: HOSPADM

## 2020-11-19 RX ORDER — SODIUM CHLORIDE, SODIUM LACTATE, POTASSIUM CHLORIDE, CALCIUM CHLORIDE 600; 310; 30; 20 MG/100ML; MG/100ML; MG/100ML; MG/100ML
INJECTION, SOLUTION INTRAVENOUS
Status: COMPLETED
Start: 2020-11-19 | End: 2020-11-19

## 2020-11-19 RX ORDER — OXYTOCIN/0.9 % SODIUM CHLORIDE 30/500 ML
340 PLASTIC BAG, INJECTION (ML) INTRAVENOUS CONTINUOUS PRN
Status: DISCONTINUED | OUTPATIENT
Start: 2020-11-19 | End: 2020-11-21 | Stop reason: HOSPADM

## 2020-11-19 RX ORDER — LIDOCAINE 40 MG/G
CREAM TOPICAL
Status: DISCONTINUED | OUTPATIENT
Start: 2020-11-19 | End: 2020-11-19

## 2020-11-19 RX ORDER — AMOXICILLIN 250 MG
2 CAPSULE ORAL 2 TIMES DAILY
Status: DISCONTINUED | OUTPATIENT
Start: 2020-11-19 | End: 2020-11-21 | Stop reason: HOSPADM

## 2020-11-19 RX ORDER — OXYTOCIN/0.9 % SODIUM CHLORIDE 30/500 ML
100 PLASTIC BAG, INJECTION (ML) INTRAVENOUS CONTINUOUS
Status: DISCONTINUED | OUTPATIENT
Start: 2020-11-19 | End: 2020-11-21 | Stop reason: HOSPADM

## 2020-11-19 RX ORDER — NALBUPHINE HYDROCHLORIDE 20 MG/ML
2.5-5 INJECTION, SOLUTION INTRAMUSCULAR; INTRAVENOUS; SUBCUTANEOUS EVERY 6 HOURS PRN
Status: DISCONTINUED | OUTPATIENT
Start: 2020-11-19 | End: 2020-11-21 | Stop reason: HOSPADM

## 2020-11-19 RX ORDER — NALOXONE HYDROCHLORIDE 0.4 MG/ML
.1-.4 INJECTION, SOLUTION INTRAMUSCULAR; INTRAVENOUS; SUBCUTANEOUS
Status: DISCONTINUED | OUTPATIENT
Start: 2020-11-19 | End: 2020-11-21 | Stop reason: HOSPADM

## 2020-11-19 RX ORDER — EPHEDRINE SULFATE 50 MG/ML
5 INJECTION, SOLUTION INTRAMUSCULAR; INTRAVENOUS; SUBCUTANEOUS
Status: DISCONTINUED | OUTPATIENT
Start: 2020-11-19 | End: 2020-11-21 | Stop reason: HOSPADM

## 2020-11-19 RX ORDER — ACETAMINOPHEN 325 MG/1
650 TABLET ORAL EVERY 4 HOURS PRN
Status: DISCONTINUED | OUTPATIENT
Start: 2020-11-19 | End: 2020-11-21 | Stop reason: HOSPADM

## 2020-11-19 RX ADMIN — SODIUM CHLORIDE, POTASSIUM CHLORIDE, SODIUM LACTATE AND CALCIUM CHLORIDE: 600; 310; 30; 20 INJECTION, SOLUTION INTRAVENOUS at 12:23

## 2020-11-19 RX ADMIN — Medication: at 10:40

## 2020-11-19 RX ADMIN — Medication 25 MCG: at 00:52

## 2020-11-19 RX ADMIN — Medication 2.5 MILLION UNITS: at 04:05

## 2020-11-19 RX ADMIN — IBUPROFEN 800 MG: 800 TABLET ORAL at 19:40

## 2020-11-19 RX ADMIN — SODIUM CHLORIDE, POTASSIUM CHLORIDE, SODIUM LACTATE AND CALCIUM CHLORIDE 1000 ML: 600; 310; 30; 20 INJECTION, SOLUTION INTRAVENOUS at 03:21

## 2020-11-19 RX ADMIN — SODIUM CHLORIDE, POTASSIUM CHLORIDE, SODIUM LACTATE AND CALCIUM CHLORIDE 1000 ML: 600; 310; 30; 20 INJECTION, SOLUTION INTRAVENOUS at 04:30

## 2020-11-19 RX ADMIN — SODIUM CHLORIDE, SODIUM LACTATE, POTASSIUM CHLORIDE, CALCIUM CHLORIDE: 600; 310; 30; 20 INJECTION, SOLUTION INTRAVENOUS at 12:23

## 2020-11-19 RX ADMIN — OXYTOCIN-SODIUM CHLORIDE 0.9% IV SOLN 30 UNIT/500ML 340 ML/HR: 30-0.9/5 SOLUTION at 16:33

## 2020-11-19 RX ADMIN — Medication 2.5 MILLION UNITS: at 08:01

## 2020-11-19 RX ADMIN — LIDOCAINE HYDROCHLORIDE,EPINEPHRINE BITARTRATE 2 ML: 15; .005 INJECTION, SOLUTION EPIDURAL; INFILTRATION; INTRACAUDAL; PERINEURAL at 03:43

## 2020-11-19 RX ADMIN — Medication: at 04:06

## 2020-11-19 RX ADMIN — Medication 2 MILLI-UNITS/MIN: at 09:23

## 2020-11-19 RX ADMIN — FENTANYL CITRATE 100 MCG: 50 INJECTION, SOLUTION INTRAMUSCULAR; INTRAVENOUS at 02:28

## 2020-11-19 RX ADMIN — LIDOCAINE HYDROCHLORIDE,EPINEPHRINE BITARTRATE 3 ML: 15; .005 INJECTION, SOLUTION EPIDURAL; INFILTRATION; INTRACAUDAL; PERINEURAL at 03:40

## 2020-11-19 RX ADMIN — SODIUM CHLORIDE, POTASSIUM CHLORIDE, SODIUM LACTATE AND CALCIUM CHLORIDE 250 ML: 600; 310; 30; 20 INJECTION, SOLUTION INTRAVENOUS at 05:05

## 2020-11-19 NOTE — PROGRESS NOTES
Blood pressure 119/60, pulse 85, temperature 97.9  F (36.6  C), resp. rate 16, last menstrual period 01/16/2020, not currently breastfeeding.    General appearance: comfortable  CONTRACTIONS: irreg and mild  Pitocin- none,  Antibiotics- PCN  FETAL HEART TONES: continuous EFM- baseline 135 with moderate variability and positive accelerations. No decelerations.  ROM: clear fluid  PELVIC EXAM:deferred    ASSESSMENT:  ==============  IUP @ 39w1d for induction of labor.  Indication: SROM without labor, ROM x 18.5 hours   Fetal Heart Rate Tracing category one  GBS- unknown/pending, antibiotics started  S/p miso PO x 3 doses    PLAN:  ===========  Discussed therapeutic rest over night options, including vistaril with or without morphine.  Pt aware of options.  Ambulation, hydration, position changes, birthing ball/sling and tub options to facilitate labor.  Reevaluate in 2-4 hours prn  Continue cervical ripening with oral Misoprostol  Continue prophylactic IV antibiotic PCN for unknown GBS status with > 18 hours ROM.     OMERO LemosM

## 2020-11-19 NOTE — PROGRESS NOTES
Blood pressure 132/79, pulse 89, temperature 98.4  F (36.9  C), temperature source Oral, resp. rate 16, last menstrual period 01/16/2020, SpO2 97 %, not currently breastfeeding.  Patient Vitals for the past 24 hrs:   BP Temp Temp src Pulse Resp SpO2   11/19/20 1500 132/79 -- -- -- -- 97 %   11/19/20 1435 -- 98.4  F (36.9  C) Oral -- -- 95 %   11/19/20 1430 -- -- -- -- -- 98 %   11/19/20 1400 -- -- -- -- -- 96 %   11/19/20 1330 -- -- -- -- -- 96 %   11/19/20 1300 125/78 98.5  F (36.9  C) Oral -- 16 97 %   11/19/20 1200 -- -- -- -- -- 96 %   11/19/20 1130 101/58 98.5  F (36.9  C) Oral -- -- 97 %   11/19/20 1100 -- -- -- -- -- 96 %   11/19/20 1030 112/53 98.4  F (36.9  C) Oral -- 18 97 %   11/19/20 0900 136/78 98.8  F (37.1  C) Oral -- -- 99 %   11/19/20 0830 131/83 -- -- -- 18 98 %   11/19/20 0800 124/66 98.6  F (37  C) Oral -- -- 97 %   11/19/20 0730 -- -- -- -- -- 98 %   11/19/20 0536 113/59 -- -- -- -- --   11/19/20 0525 122/58 -- -- -- -- --   11/19/20 0521 99/56 -- -- -- -- --   11/19/20 0519 96/49 98.7  F (37.1  C) Oral 89 -- --   11/19/20 0513 -- -- -- 82 -- 97 %   11/19/20 0500 (!) 80/40 -- -- 102 -- --   11/19/20 0432 116/65 98.7  F (37.1  C) Oral -- -- 97 %   11/19/20 0425 130/64 -- -- -- -- --   11/19/20 0419 90/52 -- -- -- -- --   11/19/20 0415 130/60 -- -- -- -- --   11/19/20 0358 123/60 -- -- -- -- 98 %   11/19/20 0355 114/58 98.7  F (37.1  C) Oral -- 14 98 %   11/19/20 0353 108/57 -- -- -- -- --   11/19/20 0352 115/53 -- -- -- -- --   11/19/20 0349 109/61 -- -- -- -- --   11/19/20 0347 120/65 -- -- -- -- --   11/19/20 0345 (!) 142/65 -- -- -- -- --   11/19/20 0341 121/70 -- -- -- -- --   11/19/20 0300 -- 98.4  F (36.9  C) Oral -- -- --   11/19/20 0134 -- 98.1  F (36.7  C) Oral -- -- --   11/19/20 0028 107/71 98.1  F (36.7  C) Oral -- 17 --   11/19/20 0006 (!) 135/99 -- -- -- -- --   11/18/20 2200 -- 97.9  F (36.6  C) Oral -- -- --   11/18/20 2029 116/67 97.7  F (36.5  C) Oral 80 18 --   11/18/20 1948 --  97.9  F (36.6  C) -- -- -- --   20 1725 -- 97.9  F (36.6  C) Oral -- -- --     General appearance: Feeling rectal pressure intermittently  CONTRACTIONS: every 2-4 minutes  Pitocin- 10 mu/min.,  Antibiotics- abx discontinued now that GBS negative result is back  FETAL HEART TONES: continuous EFM- baseline 150 with moderate variability and positive accelerations. Intermittent variable decelerations. +scalp stim- acceleration  ROM: clear fluid  PELVIC EXAM: /0, cervix on maternal right and anteriorly; reducible with ctx and push but returns between contractoins  New FSE placed    # Pain Assessment:  Current Pain Score 2020   Patient currently in pain? yes   Pain score (0-10) -   Pain location -   Pain descriptors -   - Polina is experiencing pain due to pressure. Pain management was discussed and the plan was created in a collaborative fashion.  Polina's response to the current recommendations: engaged  - epidural      ASSESSMENT:  ==============  IUP @ 39w2d, active labor     SROM since  @ 0100= 39 hours  Clear fluid, afebrile     Cervical change- 100/0     Pitocin started @ 0923, currently @ 10mu  Patient Active Problem List   Diagnosis     Major depression     Suicidal ideation     Encounter for triage in pregnant patient     Leakage of amniotic fluid     PLAN:  ===========  Anticipate .   Reevaluate in 1 hour.    OMERO Rudd CNM     Addendum @ 1630:    Pt crying with reports of increased back pain and pressure. Complete dilation/+1, begin pushing.  Prepare for delivery.    OMERO Rudd CNM

## 2020-11-19 NOTE — ANESTHESIA PREPROCEDURE EVALUATION
"Anesthesia Pre-Procedure Evaluation    Patient: Polina Barrientos   MRN:     [id number] Gender:   female   Age:    20 year old :      2000        Preoperative Diagnosis: * No surgery found *        LABS:  CBC:   Lab Results   Component Value Date    WBC 9.8 2020    WBC 10.9 2020    HGB 12.1 2020    HGB 11.7 2020    HCT 37.8 2020    HCT 35.9 2020     2020     2020     BMP:   Lab Results   Component Value Date     10/17/2015     (H) 2013    POTASSIUM 4.0 10/17/2015    POTASSIUM 3.7 2013    CHLORIDE 108 10/17/2015    CHLORIDE 106 2013    CO2 30 10/17/2015    CO2 26 2013    BUN 9 10/17/2015    BUN 11 2013    CR 0.48 (L) 2020    CR 0.66 10/17/2015    GLC 92 10/17/2015    GLC 93 2013     COAGS: No results found for: PTT, INR, FIBR  POC:   Lab Results   Component Value Date    HCG Negative 2017    HCGS Negative 2013     OTHER:   Lab Results   Component Value Date    MARLON 9.0 (L) 10/17/2015    ALBUMIN 3.3 (L) 10/17/2015    PROTTOTAL 7.2 10/17/2015    ALT 10 2020    AST 9 2020    ALKPHOS 84 10/17/2015    BILITOTAL 0.3 10/17/2015    TSH 1.50 10/16/2015        Preop Vitals    BP Readings from Last 3 Encounters:   20 107/71   20 124/74   17 122/89    Pulse Readings from Last 3 Encounters:   20 80   17 73   10/20/15 78      Resp Readings from Last 3 Encounters:   20 17   17 16   10/16/15 16    SpO2 Readings from Last 3 Encounters:   17 99%   10/16/15 97%   13 97%      Temp Readings from Last 1 Encounters:   20 36.9  C (98.4  F) (Oral)    Ht Readings from Last 1 Encounters:   10/16/15 1.575 m (5' 2\") (20 %, Z= -0.83)*     * Growth percentiles are based on CDC (Girls, 2-20 Years) data.      Wt Readings from Last 1 Encounters:   17 94.1 kg (207 lb 7 oz) (98 %, Z= 2.06)*     * Growth percentiles are based on CDC " "(Girls, 2-20 Years) data.    Estimated body mass index is 37.94 kg/m  as calculated from the following:    Height as of 10/16/15: 1.575 m (5' 2\").    Weight as of 5/5/17: 94.1 kg (207 lb 7 oz).     LDA:  Peripheral IV 11/18/20 Left Upper forearm (Active)   Site Assessment WDL 11/18/20 2030   Line Status Saline locked 11/18/20 2030   Phlebitis Scale 0-->no symptoms 11/18/20 2030   Infiltration Scale 0 11/18/20 2030   Number of days: 1        Past Medical History:   Diagnosis Date     Asthma     does not use inhaler, dx'd 2/13     Depressive disorder 2015    h/o depression and suidide attempt 2015      History reviewed. No pertinent surgical history.   No Known Allergies     Anesthesia Evaluation       history and physical reviewed . Pt has not had prior anesthetic     No history of anesthetic complications          ROS/MED HX    ENT/Pulmonary:     (+)asthma , . .    Neurologic:  - neg neurologic ROS     Cardiovascular:  - neg cardiovascular ROS       METS/Exercise Tolerance:     Hematologic: Comments: plt 233 - neg hematologic  ROS       Musculoskeletal:  - neg musculoskeletal ROS       GI/Hepatic:  - neg GI/hepatic ROS       Renal/Genitourinary:  - ROS Renal section negative       Endo:  - neg endo ROS       Psychiatric: Comment: H/o opioid use disorder    (+) psychiatric history depression      Infectious Disease: Comment: COVID neg 11/18 - neg infectious disease ROS       Malignancy:      - no malignancy   Other:    (+) no other significant disability                    JZG FV AN PHYSICAL EXAM    Assessment:   ASA SCORE: 2    H&P: History and physical reviewed and following examination; no interval change.   Smoking Status:  Non-Smoker/Unknown        Plan:   Anes. Type:  Epidural     Epidural Details:  Catheter; Lumbar   Pre-Medication: None   Induction:  N/a   Airway: Native Airway   Access/Monitoring: PIV   Maintenance: N/a     Postop Plan:   Postop Pain: Regional  Postop Sedation/Airway: Not " planned  Disposition: Inpatient/Admit     PONV Management:  NO PONV Prophylaxis Required     CONSENT: Direct conversation   Plan and risks discussed with: Patient   Blood Products: Consent Deferred (Minimal Blood Loss)             neg OB ANITA Sandoval MD

## 2020-11-19 NOTE — PROVIDER NOTIFICATION
11/19/20 1400   Provider Notification   Provider Name/Title Jimena Maldonado CNM   Method of Notification Electronic Page;At Bedside   Request Evaluate in Person   Notification Reason Pain;Status Update;SVE   KEVIN Hess notified re; pt having back pain not relieved by PCEA. SVE 6-100-1 and rotation of fetal head from posterior to OA per above CNM. FSE was placed d/t difficulty continuous trace of FHR.    Dr Lepe, [facility] notified of pt having more pain,  not pressure and gave pt epidural clinician bolus at 1338, pt had good relief. Repositioned to right side with peanut ball. Pitocin at 10. Category 1 fetal tracing.

## 2020-11-19 NOTE — PROVIDER NOTIFICATION
CNM at bedside. PCN second dose infusing, attempting to readjust toco to obtain ctx pattern. Warm pack placed to lower abdomen.

## 2020-11-19 NOTE — PROVIDER NOTIFICATION
Discussed uterine activity with CNM. RN had been bedside palpating ctx mild and then soft after the ctx. Pt breathing through ctx and states is coping. To give scheduled dose for now.

## 2020-11-19 NOTE — PROGRESS NOTES
Blood pressure 101/58, pulse 89, temperature 98.5  F (36.9  C), temperature source Oral, resp. rate 18, last menstrual period 01/16/2020, SpO2 97 %, not currently breastfeeding.  Patient Vitals for the past 24 hrs:   BP Temp Temp src Pulse Resp SpO2   11/19/20 1130 101/58 98.5  F (36.9  C) Oral -- -- 97 %   11/19/20 1100 -- -- -- -- -- 96 %   11/19/20 1030 112/53 98.4  F (36.9  C) Oral -- 18 97 %   11/19/20 0900 136/78 98.8  F (37.1  C) Oral -- -- 99 %   11/19/20 0830 131/83 -- -- -- 18 98 %   11/19/20 0800 124/66 98.6  F (37  C) Oral -- -- 97 %   11/19/20 0730 -- -- -- -- -- 98 %   11/19/20 0536 113/59 -- -- -- -- --   11/19/20 0525 122/58 -- -- -- -- --   11/19/20 0521 99/56 -- -- -- -- --   11/19/20 0519 96/49 98.7  F (37.1  C) Oral 89 -- --   11/19/20 0513 -- -- -- 82 -- 97 %   11/19/20 0500 (!) 80/40 -- -- 102 -- --   11/19/20 0432 116/65 98.7  F (37.1  C) Oral -- -- 97 %   11/19/20 0425 130/64 -- -- -- -- --   11/19/20 0419 90/52 -- -- -- -- --   11/19/20 0415 130/60 -- -- -- -- --   11/19/20 0358 123/60 -- -- -- -- 98 %   11/19/20 0355 114/58 98.7  F (37.1  C) Oral -- 14 98 %   11/19/20 0353 108/57 -- -- -- -- --   11/19/20 0352 115/53 -- -- -- -- --   11/19/20 0349 109/61 -- -- -- -- --   11/19/20 0347 120/65 -- -- -- -- --   11/19/20 0345 (!) 142/65 -- -- -- -- --   11/19/20 0341 121/70 -- -- -- -- --   11/19/20 0300 -- 98.4  F (36.9  C) Oral -- -- --   11/19/20 0134 -- 98.1  F (36.7  C) Oral -- -- --   11/19/20 0028 107/71 98.1  F (36.7  C) Oral -- 17 --   11/19/20 0006 (!) 135/99 -- -- -- -- --   11/18/20 2200 -- 97.9  F (36.6  C) Oral -- -- --   11/18/20 2029 116/67 97.7  F (36.5  C) Oral 80 18 --   11/18/20 1948 -- 97.9  F (36.6  C) -- -- -- --   11/18/20 1725 -- 97.9  F (36.6  C) Oral -- -- --   11/18/20 1528 119/60 98.2  F (36.8  C) Oral 85 16 --   11/18/20 1433 111/68 98.1  F (36.7  C) Oral 83 16 --   11/18/20 1328 -- 98.4  F (36.9  C) Oral -- -- --   11/18/20 1236 -- 98.2  F (36.8  C) Oral -- -- --      General appearance: Pt reports feeling occasional rectal pressure, has been resting  CONTRACTIONS: q2-4  Pitocin- 10 mu/min.,  Antibiotics- was receiving PCN d/t GBS unknown and ROM >18 hours. GBS back- negative, will discontinue abx  FETAL HEART TONES: continuous EFM- baseline 140 with moderate variability and positive accelerations. No decelerations.  ROM: clear fluid   PELVIC EXAM: 5/90/-3, molding, OP?  No forebag    # Pain Assessment:  Current Pain Score 11/19/2020   Patient currently in pain? denies   Pain score (0-10) -   Pain location -   Pain descriptors -   Polina sol pain level was assessed and she currently denies pain.      ASSESSMENT:  ==============  IUP @ 39w2d in early labor   Fetal Heart Rate Tracing category one  GBS- negative    SROM since 11/18 @ 0100  Clear fluid, afebrile    Cervical change- 5/90/-3    Pitocin started @ 0923, currently @ 10mu    Patient Active Problem List   Diagnosis     Major depression     Suicidal ideation     Encounter for triage in pregnant patient     Leakage of amniotic fluid     PLAN:  ===========  Throne position. Then will try sifting and side lying release.  Continue pitocin titration to adequate contraction pattern.  GBS negative. Discontinue PCN.  Monitor fluid color, maternal temp, FHR and s/s triple I.  Reevaluate prn per maternal/fetal indications.     OMERO Rudd, KEVIN

## 2020-11-19 NOTE — PROGRESS NOTES
Blood pressure 107/71, pulse 80, temperature 98.1  F (36.7  C), temperature source Oral, resp. rate 17, last menstrual period 01/16/2020, not currently breastfeeding.    Pt feeling more uncomfortable, requesting pain medications   General appearance:  uncomfortable with contractions  CONTRACTIONS: every 4-11 minutes and mild  Pitocin- none,  Antibiotics- PCN  FETAL HEART TONES: continuous EFM- baseline 140 with moderate variability and positive accelerations. No decelerations.  Loss of contact d/t maternal and fetal movement.  ROM: clear fluid  PELVIC EXAM: deferred    ASSESSMENT:  ==============  IUP @ 39w2d for induction of labor.  Indication: SROM without labor, ROM x 25.5 hours   Fetal Heart Rate Tracing category one  GBS- unknown/pending, antibiotics started    PLAN:  ===========  Offered cervical exam. Pt declined.  Pain medication options of Nitrous Oxide, Fentanyl IV and epidural reviewed with pt.  Pt given dose of fentanyl.    Reevaluate in 2-4 hours prn  Continue cervical ripening with oral Misoprostol per protocol.  Continue prophylactic IV antibiotic PCN for unknown GBS status with > 18 hrs ROM.     OMERO Lemos CNM

## 2020-11-19 NOTE — PROGRESS NOTES
Blood pressure 114/58, pulse 80, temperature 98.7  F (37.1  C), temperature source Oral, resp. rate 14, last menstrual period 01/16/2020, SpO2 98 %, not currently breastfeeding.     General appearance: resting comfortably after epidural.  Not feeling contractions.    CONTRACTIONS: every 2-3 minutes, mild  Pitocin- none,  Antibiotics- none  FETAL HEART TONES: continuous EFM- baseline 145 with moderate variability and no accelerations, no decelerations.  ROM: clear fluid  PELVIC EXAM: 2.5/ 80%/ Mid/ soft/ -2   Sanford score: 8    ASSESSMENT:  ==============  IUP @ 39w2d in early active labor, IOL for PROM  S/p epidural placement, adequate pain relief  Fetal Heart Rate Tracing category one  GBS- pending, adequately treated  Prolonged ROM x27 hours, afebrile    PLAN:  ===========  Frequent position changes to facilitate labor with epidural anesthesia.  Labor augmentation with Pitocin reviewed with pt. Agreeable to plan.   Reevaluate in 2 hours prn    I, Jessica Yan, am serving as a scribe; to document services personally performed by  OMERO Dale, KEVIN based on data collection and the provider's statements to me. - Jessica Yan, RN, SNM

## 2020-11-19 NOTE — ANESTHESIA PROCEDURE NOTES
Epidural Procedure Note      Staff -   Anesthesiologist:  Ebony Shoemaker MD  Resident/Fellow: Rena Sandoval MD  Performed By: resident  Procedure performed by resident/CRNA in presence of a teaching physician.      Location: OB     Procedure start time:  11/19/2020 3:30 AM     Procedure end time:  11/19/2020 3:45 AM   Pre-procedure checklist:   patient identified, IV checked, site marked, risks and benefits discussed, informed consent, monitors and equipment checked and pre-op evaluation      Correct Patient: Yes      Correct Position: Yes      Correct Site: Yes      Correct Procedure: Yes      Correct Laterality:  N/A    Site Marked:  Yes  Procedure:     Procedure:  Epidural catheter    ASA:  2    Position:  Sitting    Sterile Prep: chloraprep, mask, sterile gloves and patient draped      Insertion site:  L3-4    Local skin infiltration:  1% lidocaine    amount (mL):  3    Approach:  Midline    Needle gauge (G):  17    Needle Length (in):  3.5    Block Needle Type:  Touhy    Injection Technique:  LORT saline    CALLI at (cm):  8    Attempts:  1    Redirects:  1    Catheter gauge (G):  19    Catheter threaded easily: Yes      Threaded to cm at skin:  12    Threaded in epidural space (cm):  4    Paresthesias:  No    Aspiration negative for Heme or CSF: Yes       Local anesthetic:  Lidocaine 1.5% w/ 1:200,000 epinephrine    Test dose time:  03:40    Test dose negative for signs of intravascular, subdural or intrathecal injection: Yes

## 2020-11-19 NOTE — PROGRESS NOTES
Blood pressure 136/78, pulse 89, temperature 98.8  F (37.1  C), temperature source Oral, resp. rate 18, last menstrual period 01/16/2020, SpO2 99 %, not currently breastfeeding.     General appearance: Patient     CONTACTIONS: every 1.5-3 minutes  Pitocin- none,  Antibiotics- PCN  FETAL HEART TONES: continuous EFM- baseline 140 with moderate variability and positive accelerations. No decelerations.  ROM: clear fluid  PELVIC EXAM:3/ 90%/ Mid/ soft/ -2, bulging bag    ASSESSMENT:  ==============  IUP @ 39w2d in early labor   Fetal Heart Rate Tracing category one  GBS- pending, adequately treated    PLAN:  ===========  Frequent position changes to facilitate labor with epidural anesthesia.  Initiate labor augmentation with Pitocin, discussed with patient previously, who verbalized understanding and agreement with plan.   Continue prophylactic IV antibiotic PCN for unknown GBS status with prolonged ROM.   Reevaluate in 2 hours prn    IJessica, am serving as a scribe; to document services personally performed by  OMERO Rudd CNM based on data collection and the provider's statements to me.     Jessica Yan RN, SNM    The encounter was performed by me and scribed by the SNM. The scribed note accurately reflects my personal services and decisions made by me.     OMERO Rudd CNM

## 2020-11-19 NOTE — PLAN OF CARE
Provider, ELVIS Maldonado, at bedside for SVE, 9/100/0. FSE replaced. Pt states that she has some intermittent pressure. Repositioned to right side. Will continue to monitor closely.

## 2020-11-19 NOTE — PLAN OF CARE
Labor Shift Note  Data: Contractions irregular, palpate mild. Patient verbalizes cramping pain has gotten a little stronger since this afternoon. Fetal assessment category one.   Vitals:    20 1528 20 1725 20 1948 20   BP: 119/60   116/67   Pulse: 85   80   Resp: 16   18   Temp: 98.2  F (36.8  C) 97.9  F (36.6  C) 97.9  F (36.6  C) 97.7  F (36.5  C)   TempSrc: Oral Oral  Oral   Leaking scant  amounts of clear fluid.  Signs and symptoms of infection absent. Blood pressures WDL. Signs and symptoms of pre-eclampsia: absent. Support person Herve present. Patient has received 4 doses of oral Miso.   Interventions: Continue uterine/fetal assessment and administration of oral Miso following order. Vital Signs per order set. Comfort measures: Ambulate and bounce on labor ball, reposition, and Vistaril to sleep.  Plan: Anticipate . Provide labor/coping assistance as needed by patient and support person.  Observe for and notify care provider of indications of progressing labor, need for pain medications,  or signs of fetal/maternal compromise.

## 2020-11-19 NOTE — PROVIDER NOTIFICATION
"   11/19/20 1151   Provider Notification   Provider Name/Title DaniedaljitJoel CNM   Method of Notification In Department;At Bedside   Request Evaluate in Person   Notification Reason SVE   Pt stats \"feell like a gotta poop\"! Above CNM notified, SVE 5 cm 100 --3. Plan: reposition pt to high fowlers in throne position. Continue pitocin and position changes, robozo to encourage fetal descent.  "

## 2020-11-19 NOTE — PROGRESS NOTES
Pt received Epidural this morning, test dose given at 0340. VS WNL. Timeout performed at 0336, pt assisted to lying down by staff and SO at 0349. Educated pt and her SO on importance of not getting out of bed due to numbness and inability to bear weight after epidural placement. Reoriented to call light and both verbalized understanding. Lori INGRAM initiated Epidural pump. Pt has been resting comfortable. Has had no c/o pain since 0353. Has been resting with eyes closed, provided with warm blankets per request.

## 2020-11-19 NOTE — PLAN OF CARE
After receiving epidural, pt started having variable decels, moderate variability and accels present. Plan of care and pt status communicated with CNM through the night, see flowsheet charting. After epidural patient has been resting with eyes closed with audible snoring, RN continously bedside since epidural placement turning patient and doing cares. Pt is currently resting with eyes closed, left tilt with pillows for support. Jessica GONZALEZ and a CNM came bedside at 0654 this am to review EFM and discuss cares with patient. Pt is resting with eyes closed, snoring. Will inform pt CNMs were bedside and will be back by to assess. Will notify of any changes.

## 2020-11-19 NOTE — PROVIDER NOTIFICATION
Discussed pain increase, and pt request for pain medication. Have tried reposition and heat packs, back rub, stand at side of bed. CNM to be bedside to speak to patient.

## 2020-11-19 NOTE — PROVIDER NOTIFICATION
Pt resting low fowlers left tilt with pillows for support. Lights dimmed in room and SO bedside. Pt denies any pain and feeling of ctx. Resting with eyes closed. No needs.

## 2020-11-19 NOTE — PROGRESS NOTES
Blood pressure 132/79, pulse 89, temperature 98.4  F (36.9  C), temperature source Oral, resp. rate 16, last menstrual period 01/16/2020, SpO2 97 %, not currently breastfeeding.  Patient Vitals for the past 24 hrs:   BP Temp Temp src Pulse Resp SpO2   11/19/20 1500 132/79 -- -- -- -- 97 %   11/19/20 1435 -- 98.4  F (36.9  C) Oral -- -- 95 %   11/19/20 1430 -- -- -- -- -- 98 %   11/19/20 1400 -- -- -- -- -- 96 %   11/19/20 1330 -- -- -- -- -- 96 %   11/19/20 1300 125/78 98.5  F (36.9  C) Oral -- 16 97 %   11/19/20 1200 -- -- -- -- -- 96 %   11/19/20 1130 101/58 98.5  F (36.9  C) Oral -- -- 97 %   11/19/20 1100 -- -- -- -- -- 96 %   11/19/20 1030 112/53 98.4  F (36.9  C) Oral -- 18 97 %   11/19/20 0900 136/78 98.8  F (37.1  C) Oral -- -- 99 %   11/19/20 0830 131/83 -- -- -- 18 98 %   11/19/20 0800 124/66 98.6  F (37  C) Oral -- -- 97 %   11/19/20 0730 -- -- -- -- -- 98 %   11/19/20 0536 113/59 -- -- -- -- --   11/19/20 0525 122/58 -- -- -- -- --   11/19/20 0521 99/56 -- -- -- -- --   11/19/20 0519 96/49 98.7  F (37.1  C) Oral 89 -- --   11/19/20 0513 -- -- -- 82 -- 97 %   11/19/20 0500 (!) 80/40 -- -- 102 -- --   11/19/20 0432 116/65 98.7  F (37.1  C) Oral -- -- 97 %   11/19/20 0425 130/64 -- -- -- -- --   11/19/20 0419 90/52 -- -- -- -- --   11/19/20 0415 130/60 -- -- -- -- --   11/19/20 0358 123/60 -- -- -- -- 98 %   11/19/20 0355 114/58 98.7  F (37.1  C) Oral -- 14 98 %   11/19/20 0353 108/57 -- -- -- -- --   11/19/20 0352 115/53 -- -- -- -- --   11/19/20 0349 109/61 -- -- -- -- --   11/19/20 0347 120/65 -- -- -- -- --   11/19/20 0345 (!) 142/65 -- -- -- -- --   11/19/20 0341 121/70 -- -- -- -- --   11/19/20 0300 -- 98.4  F (36.9  C) Oral -- -- --   11/19/20 0134 -- 98.1  F (36.7  C) Oral -- -- --   11/19/20 0028 107/71 98.1  F (36.7  C) Oral -- 17 --   11/19/20 0006 (!) 135/99 -- -- -- -- --   11/18/20 2200 -- 97.9  F (36.6  C) Oral -- -- --   11/18/20 2029 116/67 97.7  F (36.5  C) Oral 80 18 --   11/18/20 1948 --  97.9  F (36.6  C) -- -- -- --   11/18/20 1725 -- 97.9  F (36.6  C) Oral -- -- --   11/18/20 1528 119/60 98.2  F (36.8  C) Oral 85 16 --     General appearance: Starting to feel uncomfortable- back pain, pt crying. Denies pressure. Agreeable to sve.  CONTRACTIONS:every 2-4 minutes  Pitocin- 10 mu/min.,  Antibiotics- previously on PCN d/t GBS unknown. Result: negative. Abx discontinued  FETAL HEART TONES: continuous EFM- baseline 150 with moderate variability and positive accelerations. No decelerations.  ROM: clear fluid, moderate amount  PELVIC EXAM: 6/90/-2, OP position. Manual rotation performed from OP to OA. Head better applied to cervix.      Exam after rotation 6/100/-1. Descent with contraction.    # Pain Assessment:  Current Pain Score 11/19/2020   Patient currently in pain? yes   Pain score (0-10) -   Pain location -   Pain descriptors -   feeling back pain, to call anesthesia    ASSESSMENT:  ==============  IUP @ 39w2d, active labor    SROM since 11/18 @ 0100= 38 hours  Clear fluid, afebrile     Cervical change- 6/100/-1  Manual rotation OP to OA     Pitocin started @ 0923, currently @ 10mu  Patient Active Problem List   Diagnosis     Major depression     Suicidal ideation     Encounter for triage in pregnant patient     Leakage of amniotic fluid     PLAN:  ===========  Frequent position changes to facilitate labor with epidural anesthesia to facilitate fetal descent.  More comfortable after exam and rotation. If becomes uncomfortable again, RN will page anesthesia.  Continue to monitor fluid color, maternal temp, FHR, s/s triple I.  Reevaluate prn per maternal/fetal indications.    Allyssa Maldonado CNM    Addendum @ 1500:    Anesthesia paged re: increased pain. Bolus given with good relief. Repositioned to side lying with peanut ball.  Reevaluation prn per maternal/fetal indications.    OMERO Rudd, KEVIN

## 2020-11-19 NOTE — PROGRESS NOTES
Blood pressure 116/67, pulse 80, temperature 97.9  F (36.6  C), temperature source Oral, resp. rate 18, last menstrual period 01/16/2020, not currently breastfeeding.    General appearance: starting to feel more uncomfortable  CONTRACTIONS: mild, irregular  Pitocin- none,  Antibiotics- PCN  FETAL HEART TONES: continuous EFM- baseline 135 with moderate variability and positive accelerations. No decelerations.  ROM: clear fluid  PELVIC EXAM: deferred    ASSESSMENT:  ==============  IUP @ 39w1d for induction of labor.  Indication: SROM without labor, ROM x 22.5 hours   Fetal Heart Rate Tracing category one  GBS- unknown/pending- antibiotics started    PLAN:  ===========  Encouraged hot packs and discussed options for ambulation, position changes or hydrotherapy as comfort measures.   Reevaluate in 2-4 hours prn  Continue cervical ripening with oral Misoprostol per protocol.   Continue prophylactic IV antibiotic PCN for > 18 hrs ROM with unknown GBS status.     OMERO Lemos CNM

## 2020-11-19 NOTE — PROVIDER NOTIFICATION
Jessica GONZALEZ bedside with EM Myles CNM discussing next step in augmentation with pitocin. All questions answered at the bedside.

## 2020-11-19 NOTE — PROVIDER NOTIFICATION
Readjusted monitor and patient moved to breathe through a ctx. Does not want external US readjusted at this time. Educated on importance of monitoring fetus after miso admin.

## 2020-11-19 NOTE — PROVIDER NOTIFICATION
Educated not to get out of bed due to epidural causing numbness in BLE. Discussed straight cath to empty bladder every couple of hours. Pt and partner verbalized understanding, all questions answered at the bedside.

## 2020-11-19 NOTE — PROVIDER NOTIFICATION
11/19/20 9545   Provider Notification   Provider Name/Title Jimena Maldonado CNM   Method of Notification In Department   Request Evaluate - Remote   Notification Reason Status Update   Pt sleeping and barely arouses to my voice but states she feels painful contractions. Instructed on PCEA button,  Ctx regular q 2-3 with  moderate variability and intermittent variable decels. Plan per CNNM: Hold pitocin, cervix check when available.

## 2020-11-19 NOTE — L&D DELIVERY NOTE
Delivery Summary    Polina Barrientos MRN# [id number]   Age: 20 year old YOB: 2000     Delivery Note    Labor Course: Patient received her prenatal care from [facility].  Polina Barrientos is 20 year old  @ 39w2d admitted to  20 at 1116 for PROM.  Per patient, ROM occurred with large amount of clear fluid  @ 0100.  SVE on admission 0/30/-3.  Patient received 5 doses of PO cytotec for cervical ripening.  Received an epidural at  0400, which provided adequate pain relief.   Oxytocin augmentation was initiated at 0923.  At 1400 SVE 6/90/-2, and baby was noted to be OP.  Manually rotated without difficulty to OA.  Started to feel more pressure - progressed to 9/100/0 at 1524. Practice push performed- remaining cervix on maternal right reducible but returned in between contractions. Pt repositioned to right side lying. At 1618, patient reported feeling intense pressure and a strong urge to push.    Delivery Course:  Pt became complete at 1617 and started pushing 1622. Delivered a vigorous baby girl at 1630 who was immediately placed on mom's abdomen. IV pitocin started after delivery of infant per protocol. Umbilical cord was double clamped and cut by FOB after >60 seconds. Cord segment cut for gases but were not indicated. Cord blood obtained. Placenta spontaneously delivered intact at 1639 without difficulty via Schultze mechanism. Inspection of vagina and perineum revealed a 1cm abrasion at the vaginal introitus, and <0.5cm bilateral periurethral abrasions. All hemostatic and not requiring repair. Fundus is firm and midline.  Patient afebrile, but vagina, baby and placenta warm.  Concern for developing triple III, so placenta was sent to pathology.  Mom and baby are stable.       IUP at 39 weeks gestation delivered on 2020.     delivery of a viable Female infant.  Weight : 6lb 11oz  Apgars of 9 at 1 minute and 9 at 5 minutes.  Labor was  induced.  Medications administered  in labor:  Pain Rx Epidural; Antibiotics Yes: was treated with PCN after 18 hours ROM for unknown GBS.  This was d/c'd when GBS resulted negative.  Perineum: 1cm abrasion at the vaginal introitus, and <0.5cm bilateral periurethral abrasions, not repaired  Placenta-mechanism: spontaneous, intact,  with a 3 vessel cord. IV oxytocin was given.  QBL was 57ml  Anticipated Discharge Date: 20  Complications of pregnancy, labor and delivery: prolonged rom  Birth attendants: OMERO Rudd, KEVIN and Jessica Yan RN,SNM       Bam Barrientos-Polina [[id number]]    Labor Event Times    Labor onset date: 20 Onset time: 11:30 AM   Dilation complete date: 20 Complete time:  4:17 PM   Start pushing date/time: 2020 1622      Labor Length    1st Stage (hrs): 4 (min): 47   2nd Stage (hrs): 0 (min): 13   3rd Stage (hrs): 0 (min): 9      Labor Events     labor?: No   steroids: None  Labor Type: Induction/Cervical ripening     Antibiotics received during labor?: Yes  Antibiotics received for GBS: Penicillin     Rupture date/time: 20 0100   Rupture type: Spontaneous rupture of membranes occuring during spontaneous labor or augmentation  Fluid color: Clear  Fluid odor: Normal     Induction: Misoprostol, Oxytocin  Induction date/time:     Cervical ripening date/time:     Indications for induction: Prolonged ROM     1:1 continuous labor support provided by?: RN       Delivery/Placenta Date and Time    Delivery Date: 20 Delivery Time:  4:30 PM   Placenta Date/Time: 2020  4:39 PM     Vaginal Counts     Initial count performed by 2 team members:  Two Team Members   KEVIN Hawthorne RN       Needles Suture [place] Sponges Instruments   Initial counts 2 0 5    Added to count 0 0 0    Final counts 2 0 5    Placed during labor Accounted for at the end of labor   Yes Yes   No NA   No NA    Final count performed by 2 team  "members:  Two Team Members   KEVIN Hawthorne RN      Final count correct?: Yes     Apgars    Living status: Living   1 Minute 5 Minute 10 Minute 15 Minute 20 Minute   Skin color: 1  1       Heart rate: 2  2       Reflex irritability: 2  2       Muscle tone: 2  2       Respiratory effort: 2  2       Total: 9  9       Apgars assigned by: ANDREW GORMAN RN     Cord    Vessels: 3 Vessels Complications: None   Cord Blood Disposition: Lab Gases Sent?: No      [place] Resuscitation    Methods: None  [place] Care at Delivery: Stimulated to cry. To moms abdomen. Pinked up well.  Output in Delivery Room: Stool      Measurements    Weight: 6 lb 11 oz Length: 1' 6.5\"   Head circumference: 33 cm       Skin to Skin and Feeding Plan    Skin to skin initiation date/time: 1841    Skin to skin with: Mother  Skin to skin end date/time:     Breastfeeding initiated date/time: 2020 1710  How do you plan to feed your baby: Breastfeeding     Labor Events and Shoulder Dystocia    Fetal Tracing Prior to Delivery: Category 2     Delivery (Maternal) (Provider to Complete) ([id number])    Episiotomy: None  Perineal lacerations: None    Periurethral laceration: bilateral Repaired?: No      Blood Loss  Mother: Polina Barrientos #[id number]   Start of Mother's Information    IO Blood Loss  20 1130 - 20 1849    Delivery QBL (mL) Hospital Encounter 57 mL    Total  57 mL         End of Mother's Information  Mother: Polina Barrientos #[id number]          Delivery - Provider to Complete ([id number])    Delivering clinician: Allyssa Maldonado CNM  CNELLIOTT Care: Exclusive CNM care in labor  Attempted Delivery Types (Choose all that apply): Spontaneous Vaginal Delivery  Delivery Type (Choose the 1 that will go to the Birth History): Vaginal, Spontaneous                   Other personnel:  Provider Role   Jessica Yan Darrah Jean, CNM Certified Nurse Midwife   Ekaterina Menard, " RN Delivery Nurse   Cynthia Rendon RN Registered Nurse                Placenta    Delayed Cord Clamping: Done  Date/Time: 11/19/2020  4:39 PM  Removal: Spontaneous  Disposition: Pathology           Anesthesia    Method: Epidural                Presentation and Position    Presentation: Vertex    Position: Left Occiput Anterior                 IJessica, am serving as a scribe; to document services personally performed by  OMERO Rudd CNM based on data collection and the provider's statements to me.     Jessica Yan RN, SNM    The encounter was performed by me and scribed by the SNM. The scribed note accurately reflects my personal services and decisions made by me.     OMERO Rudd CNM

## 2020-11-19 NOTE — PROVIDER NOTIFICATION
Discussed fentanyl and epidural as pain management options. Questions answered at the bedside with pt and her SO.

## 2020-11-20 PROBLEM — D62 ANEMIA DUE TO BLOOD LOSS, ACUTE: Status: ACTIVE | Noted: 2020-11-20

## 2020-11-20 LAB
ERYTHROCYTE [DISTWIDTH] IN BLOOD BY AUTOMATED COUNT: 15.6 % (ref 10–15)
HCT VFR BLD AUTO: 31.2 % (ref 35–47)
HGB BLD-MCNC: 10 G/DL (ref 11.7–15.7)
MCH RBC QN AUTO: 27.5 PG (ref 26.5–33)
MCHC RBC AUTO-ENTMCNC: 32.1 G/DL (ref 31.5–36.5)
MCV RBC AUTO: 86 FL (ref 78–100)
PLATELET # BLD AUTO: 222 10E9/L (ref 150–450)
RBC # BLD AUTO: 3.63 10E12/L (ref 3.8–5.2)
WBC # BLD AUTO: 16.3 10E9/L (ref 4–11)

## 2020-11-20 PROCEDURE — 85027 COMPLETE CBC AUTOMATED: CPT | Performed by: ADVANCED PRACTICE MIDWIFE

## 2020-11-20 PROCEDURE — 99232 SBSQ HOSP IP/OBS MODERATE 35: CPT | Performed by: ADVANCED PRACTICE MIDWIFE

## 2020-11-20 PROCEDURE — 36415 COLL VENOUS BLD VENIPUNCTURE: CPT | Performed by: ADVANCED PRACTICE MIDWIFE

## 2020-11-20 PROCEDURE — 250N000013 HC RX MED GY IP 250 OP 250 PS 637: Performed by: ADVANCED PRACTICE MIDWIFE

## 2020-11-20 PROCEDURE — 120N000002 HC R&B MED SURG/OB UMMC

## 2020-11-20 RX ORDER — FERROUS SULFATE 325(65) MG
325 TABLET ORAL
Qty: 60 TABLET | Refills: 0 | Status: SHIPPED | OUTPATIENT
Start: 2020-11-20 | End: 2022-03-31

## 2020-11-20 RX ORDER — MULTIVIT WITH MINERALS/LUTEIN
250 TABLET ORAL DAILY
Qty: 90 TABLET | Refills: 2 | Status: SHIPPED | OUTPATIENT
Start: 2020-11-20 | End: 2022-03-31

## 2020-11-20 RX ORDER — ACETAMINOPHEN 325 MG/1
650 TABLET ORAL EVERY 6 HOURS PRN
Qty: 100 TABLET | Refills: 0 | Status: ON HOLD | OUTPATIENT
Start: 2020-11-20 | End: 2022-11-07

## 2020-11-20 RX ORDER — IBUPROFEN 600 MG/1
600 TABLET, FILM COATED ORAL EVERY 6 HOURS PRN
Qty: 60 TABLET | Refills: 0 | Status: SHIPPED | OUTPATIENT
Start: 2020-11-20 | End: 2022-03-17

## 2020-11-20 RX ORDER — LANOLIN ALCOHOL/MO/W.PET/CERES
1000 CREAM (GRAM) TOPICAL DAILY
Qty: 90 TABLET | Refills: 2 | Status: SHIPPED | OUTPATIENT
Start: 2020-11-20 | End: 2022-03-31

## 2020-11-20 RX ORDER — AMOXICILLIN 250 MG
1 CAPSULE ORAL DAILY
Qty: 100 TABLET | Refills: 0 | Status: SHIPPED | OUTPATIENT
Start: 2020-11-20 | End: 2022-03-31

## 2020-11-20 RX ADMIN — DOCUSATE SODIUM AND SENNOSIDES 1 TABLET: 8.6; 5 TABLET ORAL at 21:39

## 2020-11-20 RX ADMIN — IBUPROFEN 800 MG: 800 TABLET ORAL at 02:35

## 2020-11-20 RX ADMIN — IBUPROFEN 800 MG: 800 TABLET ORAL at 16:56

## 2020-11-20 NOTE — PLAN OF CARE
Patient's vss, postpartum assessment is wdl,  breastfeeding infant on demand. Plan of care reviewed with patient. Will continue to monitor.

## 2020-11-20 NOTE — PROGRESS NOTES
Polina Barrientos      MRN#: [id number]  Age: 20 year old      YOB: 2000      PPD #1 S/P   Patient feels well and is without issue, baby is rooming in  Breast feeding status:well established  Complications since 2 hours post delivery: None  Patient is tolerating regular diet,  tolerating acitivity well, voiding without difficulty, cramping is relieved by Ibuprophen, lochia is decreasing, no clots.  Perineal pain is is relieved by Ibuprophen.  The perineum laceration is well approximated.   no BM.           SIGNIFICANT PROBLEMS:  Patient Active Problem List    Diagnosis Date Noted     Anemia due to blood loss, acute 2020     Priority: Medium      (normal spontaneous vaginal delivery) 2020     Priority: Medium     Encounter for triage in pregnant patient 2020     Priority: Medium     Leakage of amniotic fluid 2020     Priority: Medium     Suicidal ideation 10/16/2015     Priority: Medium     Major depression 2013     Priority: Medium         PE:    Vitals:    20 2345 20 0230 20 0826 20 1500   BP: 106/68 103/62 110/73 114/70   Pulse: 85 82 93 75   Resp:    Temp: 98.1  F (36.7  C) 97.6  F (36.4  C) 97.7  F (36.5  C) 99  F (37.2  C)   TempSrc: Oral Oral Oral Oral   SpO2: 98%          NAD  Caridac- Regular rate and rhythm  Lungs- CTA bilat  Breasts: Soft, filling  Nipples: Intact, Non-tender  Abdomen: Soft, Non-tender      Uterus: Fundus Firm, Non-tender, located -1 below the umbilicus   Lochia: Rubra, appropriate amount    Perineum:  Well-approximated, healing well  Lower Extremities:  Edema Bilateral, Negative Lorin's Sign        Postpartum hemoglobin    Recent Labs   Lab 20  0659 20  1127   HGB 10.0* 12.1     Blood type   Lab Results   Component Value Date    ABO O 2020       Lab Results   Component Value Date    RH Pos 2020     Rubella status - nonimmune      Assessment/Plan-   Stable  Post-partum day #1  Complications:anemic, plan for iron supplementation  breastfeeding      Teaching done: D/C Instructions: Nutrition/Activity, Birth Control Options, Warning Signs/When to Call: Excessive Bleeding, Infection, PP Depression, RTC Clinic for PP Appointment, PNV and Iron supplemenation    Postpartum warning s/s reviewed, including bleeding/clots, fever, mastitis, or depression    Birthcontrol planned:unsure, considering nexplanon, discussed IUD  Current Discharge Medication List      START taking these medications    Details   !! acetaminophen (TYLENOL) 325 MG tablet Take 2 tablets (650 mg) by mouth every 6 hours as needed for mild pain Start after Delivery.  Qty: 100 tablet, Refills: 0    Associated Diagnoses:  (normal spontaneous vaginal delivery)      cyanocobalamin (VITAMIN B-12) 1000 MCG tablet Take 1 tablet (1,000 mcg) by mouth daily  Qty: 90 tablet, Refills: 2    Associated Diagnoses:  (normal spontaneous vaginal delivery); Anemia due to blood loss, acute      ferrous sulfate (FEROSUL) 325 (65 Fe) MG tablet Take 1 tablet (325 mg) by mouth daily (with breakfast)  Qty: 60 tablet, Refills: 0    Associated Diagnoses:  (normal spontaneous vaginal delivery); Anemia due to blood loss, acute      !! ibuprofen (ADVIL/MOTRIN) 600 MG tablet Take 1 tablet (600 mg) by mouth every 6 hours as needed for moderate pain Start after delivery  Qty: 60 tablet, Refills: 0    Associated Diagnoses:  (normal spontaneous vaginal delivery)      senna-docusate (SENOKOT-S/PERICOLACE) 8.6-50 MG tablet Take 1 tablet by mouth daily Start after delivery.  Qty: 100 tablet, Refills: 0    Associated Diagnoses:  (normal spontaneous vaginal delivery)      vitamin C (ASCORBIC ACID) 250 MG tablet Take 1 tablet (250 mg) by mouth daily  Qty: 90 tablet, Refills: 2    Associated Diagnoses:  (normal spontaneous vaginal delivery); Anemia due to blood loss, acute       !! - Potential duplicate medications found.  Please discuss with provider.      CONTINUE these medications which have NOT CHANGED    Details   !! Acetaminophen (TYLENOL PO)       ALBUTEROL INHALER 17GM Inhale  into the lungs. This product no longer available      buPROPion (WELLBUTRIN XL) 300 MG 24 hr tablet Take 1 tablet (300 mg) by mouth daily  Qty: 30 tablet, Refills: 6    Associated Diagnoses: Major depressive disorder, single episode, severe without psychotic features (H)      !! IBUPROFEN PO       Prenatal Vit-Fe Fumarate-FA (PRENATAL MULTIVITAMIN  PLUS IRON) 27-1 MG TABS Take 1 tablet by mouth daily       !! - Potential duplicate medications found. Please discuss with provider.          Routine Postpartum Management  Encouraged Postpartum videos before discharge  Anticipate discharge to home tomorrow  RTC 1 week, and and 6 week postpartum visit     OMERO Palomo CNM

## 2020-11-20 NOTE — PLAN OF CARE
Patient VSS, FFU/1, with light lochia. Voiding without difficulty.  Patient is independent with self cares and is ambulating without assistance.  Patient is taking Ibuprofen for pain and states the medication is controlling her pain adequately.  Plan of care reviewed with patient, understanding verbalized.  Continue with postpartum cares and assemssments.

## 2020-11-20 NOTE — PLAN OF CARE
Data: VSS, postpartum assessments WNL. She is able to void. Legs are still dense; standby assist. Eating and drinking normally. Perineum appears to be healing well, lochia WNL, no s/s infection. She is independent with self and infant cares. Breastfeeding infant with assistance. Taking ibuprofen for pain and educated on using the following non-pharmacologic methods: miguel angel ice packs, witch hazel, bath salts . Reports good pain management.   Action: Education provided on plan of care  Response: Pt is agreeable with her plan of care. Positive attachment behaviors observed with infant. Support person, JESSIKA Edgar, present. Anticipate discharge plan of care.

## 2020-11-20 NOTE — PLAN OF CARE
VSS. Afebrile.  of viable baby girl at 1631. Infant apgars of 9/9, bonding well with mother and father. Currently skin to skin. Will continue to monitor closely.

## 2020-11-20 NOTE — PLAN OF CARE
"Postpartum checks WNL. Light rubra lochia, uterus midline/U/firm. Pt still feeling like her legs \"are heavy.\" Successfully voided and ate dinner prior to transfer. Anticipate transfer to [facility].  "

## 2020-11-20 NOTE — PLAN OF CARE
Patient arrived to [facility] unit via wheelchair at 1930,with belongings, accompanied by spouse/ significant other, with infant in arms. Received report from OSMAR Tobar and checked bands. Unit and room orientation completd. Call light given; no concerns present at this time. Continue with plan of care.

## 2020-11-20 NOTE — PLAN OF CARE
Data: Polina Barrientos transferred to [facility] via wheelchair at 1925. Baby transferred via parent's arms.  Action: Receiving unit notified of transfer: Yes. Patient and family notified of room change. Report given to OSMAR Victoria, at 1930. Belongings sent to receiving unit. Accompanied by Registered Nurse. Oriented patient to surroundings. Call light within reach. ID bands double-checked with receiving RN.  Response: Patient tolerated transfer and is stable.

## 2020-11-21 VITALS
TEMPERATURE: 98.2 F | SYSTOLIC BLOOD PRESSURE: 120 MMHG | RESPIRATION RATE: 18 BRPM | HEART RATE: 76 BPM | DIASTOLIC BLOOD PRESSURE: 80 MMHG | OXYGEN SATURATION: 98 %

## 2020-11-21 PROCEDURE — 90471 IMMUNIZATION ADMIN: CPT | Performed by: ADVANCED PRACTICE MIDWIFE

## 2020-11-21 PROCEDURE — 250N000011 HC RX IP 250 OP 636: Performed by: ADVANCED PRACTICE MIDWIFE

## 2020-11-21 PROCEDURE — 90707 MMR VACCINE SC: CPT | Performed by: ADVANCED PRACTICE MIDWIFE

## 2020-11-21 PROCEDURE — 99238 HOSP IP/OBS DSCHRG MGMT 30/<: CPT | Performed by: MIDWIFE

## 2020-11-21 PROCEDURE — 250N000013 HC RX MED GY IP 250 OP 250 PS 637: Performed by: ADVANCED PRACTICE MIDWIFE

## 2020-11-21 RX ADMIN — MEASLES, MUMPS, AND RUBELLA VIRUS VACCINE LIVE 0.5 ML: 1000; 12500; 1000 INJECTION, POWDER, LYOPHILIZED, FOR SUSPENSION SUBCUTANEOUS at 17:16

## 2020-11-21 RX ADMIN — IBUPROFEN 800 MG: 800 TABLET ORAL at 13:01

## 2020-11-21 RX ADMIN — IBUPROFEN 800 MG: 800 TABLET ORAL at 01:18

## 2020-11-21 NOTE — PLAN OF CARE
Data: Vital signs within normal limits. Postpartum checks within normal limits - see flow record. Patient eating and drinking normally. Patient able to empty bladder independently and is up ambulating. Patient performing self cares and is able to care for infant.  Action: Patient medicated during the shift for uterine cramping.   Response: Positive attachment behaviors observed with infant.   Will continue to monitor and provide support until discharge.

## 2020-11-21 NOTE — LACTATION NOTE
This note was copied from a baby's chart.  Consult for: First time breastfeeding fairly well, mom discouraged with baby cluster feeding on 2nd night and ask for formula, RN taught mom to hand express and they gave baby extra colostrum with finger feeding.     History:  Vaginal delivery @ 39w2d, AGA infant @ 6# 11 oz. birthweight, 5.2% loss at 24 hours with high risk serum bilirubin initially, high intermediate risk level on recheck. Diaper output as expected for age but slowing down today. Maternal history of depression and suicide attempt in 2015, used to take Wellbutrin 300 mg daily but no current medications. Per H&P note, history of OUD with intermittent use of suboxone without prescription, declined initiation. Mom's UDS negative on admission, cord results pending.     Breast exam of mom: Soft, pendulous, symmetric breasts with intact, everted nipples bilaterally.     Feeding assessment:  Mom attempting to latch baby but just on nipple.  With permission, hands on assist to get deeper latch. With most of areola in mouth (all of lower portion), mom noted much more comfortable (denies pain) and able to see difference with long, deep jaw pulls and audible swallowing every 2 sucks. Baby fed about 15 minutes on right side and pulled off fully contented, fell asleep, nipple rounded when she came off.    Education provided: Discussed positioning with good support, anatomy of breast and infant mouth, tips to get and maintain deeper latch, breast compressions prn to enhance milk transfer, nutritive vs. non-nutritive suck and how to hear swallows, benefits of feeding on cue and frequent hand expression in early days to help give her more as needed and boost supply. Reviewed baby's second night, how to tell when satiated and if getting enough, what to expect in the coming days and preventing engorgement, breastfeeding log with when and who to call if concerns, [facility] pump cleaning handout and lactation resources for after  discharge.    Plan: Please encourage frequent skin to skin, breastfeed on cue with goal of 8 to 12 feedings in 24 hours. Recommend continued hand expressing after feedings until milk is in, feed back results. Follow up with outpatient lactation through clinic as needed for support with first time breastfeeding, mom thinks they have LC at her  clinic.

## 2020-11-21 NOTE — PLAN OF CARE
Home care referral placed through [facility] care for teen mother first time breastfeeding.

## 2020-11-21 NOTE — CONSULTS
[facility]  MATERNAL CHILD HEALTH   INITIAL PSYCHOSOCIAL ASSESSMENT     DATA:     Presenting Information: Mom is Polina, 20 yrs  who delivered an infant, Mignon on 20 at 39.2 weeks gestation in the setting of vaginal delivery. SW was consulted for community resources, discharge planning. SW met with parents virtually over ipad.     Living Situation: Parents are Polina and Herve, who live together in [place] along with Polina's grandmother and aunty. Her aunty recently tested positive for COVID 19. Parents report that they both were positive earlier during this pregnancy. They have applied for Section 8 housing and have heard that they may have housing in  January. Parents report that they have their own room but share communal space of kitchen and bathroom with others. They state they have access to cleaning supplies and masks for household members.     Parents state they have been reaching out to others for a place to stay but no plan at this time. Herve reflects that he is more aware of services for individuals versus as a family unit.     Social Support: family, friends.    Education/Employment: Herve is employed by [employer] ([employer] facility that does not require residents to be sober in order to maintain their housing) and he is working with coworkers for assistance with housing.     Insurance: [employer] [place]    INTERVENTION:       Chart review    Collaboration with team: bedside RN    Conducted Psychosocial Assessment    Introduction to Maternal Child Health RBITTANY role and scope of practice    Validated emotions and provided supportive listening    Attempted to reach [facility] for further information on housing issues, unable to do so due to weekend.     Provided resources and referrals    [facility] ; encourage parents to reach out on Monday to explore options.     ASSESSMENT:     Coping: Parents appear  to have a flat affect, they are attentive to baby during our visit. Parents seem to be somewhat ambivalent considering a shelter versus their current housing arrangement. Parents seem to desire protection for their baby but have limited options.     Assessment of parental risk for PMAD: SW does not have knowledge of circumstances that would contribute to a high risk.     Risk Factors: Housing, support system.     Resiliency Factors & Strengths: Family seems resilient and motivated to seek community resources that may be helpful for them.     PLAN:     Re-consult for SW to follow if needs arise.    Kjerstin Rydeen, [facility]   Social Worker  Maternal Child Health   Direct: [phone]  Pager: [phone]

## 2020-11-21 NOTE — PLAN OF CARE
VSS. Reported moderate to severe cramping last night but pain improved overnight. Taking ibuprofen and using heat packs as needed for pain. Patient was struggling to latch baby overnight. RN showed patient football hold on right side and explained importance of deep latch. Right nipple is painful. Baby eats better on left side. Encouraged patient to call out for breastfeeding support as needed overnight as she expressed that she wasn't sure breastfeeding was going to work out even though she really wanted to breastfeed so she could be close to her baby. She became overwhelmed with breastfeeding overnight due to infant cluster feeding and difficulties latching baby. She was crying when RN entered her room when she asked for a bottle. RN spoke to her about risk of formula supplementation if her goal is to exclusively breastfeed. Discussed option of hand expressing and using an alternative feeding method to give baby expressed milk instead of giving formula with bottle. Patient chose to try hand expression and feeding baby with curved tip syringe. RN was easily able to hand express 5cc and demonstrated how to finger feed baby with curve tip syringe. Patient seemed relieved knowing that baby had eaten 5cc. Lactation consultation ordered for today.

## 2020-11-21 NOTE — DISCHARGE SUMMARY
Post Partum Note and Discharge Summary       SIGNIFICANT PROBLEMS:  Patient Active Problem List    Diagnosis Date Noted     Anemia due to blood loss, acute 2020     Priority: Medium      (normal spontaneous vaginal delivery) 2020     Priority: Medium     Encounter for triage in pregnant patient 2020     Priority: Medium     Leakage of amniotic fluid 2020     Priority: Medium     Suicidal ideation 10/16/2015     Priority: Medium     Major depression 2013     Priority: Medium       ADMISSION DIAGNOSIS:  Labor and Delivery   DISCHARGE DIAGNOSIS:     Anemia due to acute blood loss     HOSPITAL COURSE:  39w2d  Polina Barrientos is a 20 year old female     Labor Course: Patient received her prenatal care from [facility].  Polina Barrientos is 20 year old  @ 39w2d admitted to  20 at 1116 for PROM.  Per patient, ROM occurred with large amount of clear fluid  @ 0100.  SVE on admission 0/30/-3.  Patient received 5 doses of PO cytotec for cervical ripening.  Received an epidural at  0400, which provided adequate pain relief.   Oxytocin augmentation was initiated at 0923.  At 1400 SVE 6/90/-2, and baby was noted to be OP.  Manually rotated without difficulty to OA.  Started to feel more pressure - progressed to 9/100/0 at 1524. Practice push performed- remaining cervix on maternal right reducible but returned in between contractions. Pt repositioned to right side lying. At 1618, patient reported feeling intense pressure and a strong urge to push.     Delivery Course:  Pt became complete at 1617 and started pushing 1622. Delivered a vigorous baby girl at 1630 who was immediately placed on mom's abdomen. IV pitocin started after delivery of infant per protocol. Umbilical cord was double clamped and cut by FOB after >60 seconds. Cord segment cut for gases but were not indicated. Cord blood obtained. Placenta spontaneously delivered intact  at 1639 without difficulty via Schultze mechanism. Inspection of vagina and perineum revealed a 1cm abrasion at the vaginal introitus, and <0.5cm bilateral periurethral abrasions. All hemostatic and not requiring repair. Fundus is firm and midline.  Patient afebrile, but vagina, baby and placenta warm.  Concern for developing triple III, so placenta was sent to pathology.  Mom and baby are stable.       IUP at 39 weeks gestation delivered on 2020.     delivery of a viable Female infant.  Weight : 6lb 11oz  Apgars of 9 at 1 minute and 9 at 5 minutes.  Labor was induced.  Medications administered  in labor:  Pain Rx Epidural; Antibiotics Yes: was treated with PCN after 18 hours ROM for unknown GBS.  This was d/c'd when GBS resulted negative.  Perineum: 1cm abrasion at the vaginal introitus, and <0.5cm bilateral periurethral abrasions, not repaired  Placenta-mechanism: spontaneous, intact,  with a 3 vessel cord. IV oxytocin was given.  QBL was 57ml  Anticipated Discharge Date: 20  Complications of pregnancy, labor and delivery: prolonged rom  Birth attendants: OMERO Rudd, CNM and Jessica Yan RN,SNM    INTERVAL HISTORY:  /74   Pulse 78   Temp 98.1  F (36.7  C) (Oral)   Resp 18   LMP 2020   SpO2 98%   Breastfeeding Unknown   Pt stable, baby is rooming in. Mikhail Crow is at bedside sleep and has been stable.   Breast feeding status: well established. Polina has been breastfeeding with minimal assistance. She reports early this morning she also hand expressed 5mL and dropper fed this to baby.   # Discharge Pain Plan:   - During her hospitalization, Polina experienced pain due to vaginal deliver. The pain plan for discharge was discussed with Polina and the plan was created in a collaborative fashion.    - She will continue ibuprofen and Tylenol     Complications since 2 hours post delivery: None  Patient is tolerating activity well, voiding and had one BM  without difficulty. Cramping and perineal pain is relieved by Ibuprofen. Lochia is decreasing and patient denies clots. No headache, vision changes, or leg pain.     Physical Exam:  General: Bright affect, loving with baby. Partner Herve at bedside.   Breasts: soft, nontender, nipples intact, no cracking, redness or bruising.   Abdomen: soft, nontender, fundus 1cm below U.   Lochia: small amount, rubra, no clots or odor.   Perineum: no bruising or swelling   Extremities: no edema, erythema or tenderness with palpation     Postpartum hemoglobin   Hemoglobin   Date Value Ref Range Status   2020 10.0 (L) 11.7 - 15.7 g/dL Final      Prenatal Labs:   Lab Results   Component Value Date    ABO O 2020    RH Pos 2020    AS Neg 2020    HEPBANG non-reactive 2020    RUQIGG <0.90 2020     Rubella: NEEDS MMR- pt is accepting of this prior to discharge   History of depression: Yes. Postpartum depression warning signs reviewed in detail.     ASSESSMENT/PLAN:  Normal postpartum exam , Stable Post-partum day #2  Complications:anemic, plan for iron supplementation and high risk for postpartum depression  Plan d/c home today. Home Visit Ordered- To be ordered by RN  RTC 1 week and 6 weeks   Postpartum warning s/s reviewed, including bleeding/clots, fever, mastitis, or depression  Kegels/ crunches  Continue prenatal vitamins  Birthcontrol planned: reviewed implant and IUD options     PROCEDURES:      Current Discharge Medication List      START taking these medications    Details   !! acetaminophen (TYLENOL) 325 MG tablet Take 2 tablets (650 mg) by mouth every 6 hours as needed for mild pain Start after Delivery.  Qty: 100 tablet, Refills: 0    Associated Diagnoses:  (normal spontaneous vaginal delivery)      cyanocobalamin (VITAMIN B-12) 1000 MCG tablet Take 1 tablet (1,000 mcg) by mouth daily  Qty: 90 tablet, Refills: 2    Associated Diagnoses:  (normal spontaneous vaginal delivery);  Anemia due to blood loss, acute      ferrous sulfate (FEROSUL) 325 (65 Fe) MG tablet Take 1 tablet (325 mg) by mouth daily (with breakfast)  Qty: 60 tablet, Refills: 0    Associated Diagnoses:  (normal spontaneous vaginal delivery); Anemia due to blood loss, acute      !! ibuprofen (ADVIL/MOTRIN) 600 MG tablet Take 1 tablet (600 mg) by mouth every 6 hours as needed for moderate pain Start after delivery  Qty: 60 tablet, Refills: 0    Associated Diagnoses:  (normal spontaneous vaginal delivery)      senna-docusate (SENOKOT-S/PERICOLACE) 8.6-50 MG tablet Take 1 tablet by mouth daily Start after delivery.  Qty: 100 tablet, Refills: 0    Associated Diagnoses:  (normal spontaneous vaginal delivery)      vitamin C (ASCORBIC ACID) 250 MG tablet Take 1 tablet (250 mg) by mouth daily  Qty: 90 tablet, Refills: 2    Associated Diagnoses:  (normal spontaneous vaginal delivery); Anemia due to blood loss, acute       !! - Potential duplicate medications found. Please discuss with provider.      CONTINUE these medications which have NOT CHANGED    Details   !! Acetaminophen (TYLENOL PO)       ALBUTEROL INHALER 17GM Inhale  into the lungs. This product no longer available      buPROPion (WELLBUTRIN XL) 300 MG 24 hr tablet Take 1 tablet (300 mg) by mouth daily  Qty: 30 tablet, Refills: 6    Associated Diagnoses: Major depressive disorder, single episode, severe without psychotic features (H)      !! IBUPROFEN PO       Prenatal Vit-Fe Fumarate-FA (PRENATAL MULTIVITAMIN  PLUS IRON) 27-1 MG TABS Take 1 tablet by mouth daily       !! - Potential duplicate medications found. Please discuss with provider.        OMERO Morley ELLIOTT     ADDENDUM at 1053:   Call from nurse to discuss patient's living situation. She currently has several roommates and one just called to notify Polina and Herve that she her COVID test came back positive today. SW consult placed to discuss resources and plan for discharge. Pt's partner,  "Herve, is contacting family to find an alternate home to discharge. Will plan to discharge later today after SW consult.     OMERO Morley CNM    ADDENDUM at 1400:   Polina and Herve report that they both had COVID at the beginning of the summer. Reviewed that this does not guarantee they can not get COVID again. They are unsure when they last had contact with the roommate that tested positive, but say \"it was a while ago.\" Discussed recommendations for quarantining if recent close contact, and reviewed ways to help prevent transmission of virus from roommate to baby or themselves. Discharge order placed.     OMERO Morley, KEVIN  "

## 2020-11-21 NOTE — PLAN OF CARE
Patient has good pain control with motrin. Discharge today but boarding in the room with baby. Discharge education and instructions reviewed and verbalized understanding.

## 2020-11-25 ENCOUNTER — CARE COORDINATION (OUTPATIENT)
Dept: CARE COORDINATION | Facility: CLINIC | Age: 20
End: 2020-11-25

## 2020-11-25 ENCOUNTER — TELEPHONE (OUTPATIENT)
Dept: OBGYN | Facility: CLINIC | Age: 20
End: 2020-11-25

## 2020-11-25 NOTE — PROGRESS NOTES
[facility]  MATERNAL CHILD HEALTH   SOCIAL WORK PROGRESS NOTE      DATA:     Communicated with CPS investigator, Elly ([phone]).   Per consultatation with Chemistry resident 's cord results positive for oxycodone, oxymorphone, noroxycodone, noroxymorphone, norbuprenorphine. Mom was provided oxycodone during delivery. Norbuprenorphine is indicative of suboxone use.     INTERVENTION:     This  reviewed the chart and coordinated  resident regarding the toxicology results and medications provided to momPolina during delivery.    Communicated findings with CPS investigator over the phone.    ASSESSMENT:     CPS involved due to substance use prenatally.     PLAN:     Re-consult for SW to follow if needs arise.      Kjerstin Rydeen, [facility]   Social Worker  Maternal Child Health   Direct: [phone]  Pager: [phone]

## 2020-11-25 NOTE — TELEPHONE ENCOUNTER
Attempted to call Polina to follow up on breastfeeding.  Pt's mother answered, and stated that she was not sure where Polina was, as she had already had the baby.  Explained I am a breastfeeding consultant calling from the [facility], and asked her to let Polina know I had called to check on how feeding is going.  Will try to contact Polina again at another time.

## 2020-12-02 ENCOUNTER — TELEPHONE (OUTPATIENT)
Dept: OBGYN | Facility: CLINIC | Age: 20
End: 2020-12-02

## 2020-12-02 NOTE — TELEPHONE ENCOUNTER
Made second attempt to call Polina to check in with breastfeeding;  Breastfeeding on discharge, also has CPS referral for BERT.  Reached pt's mother who states that Polina is in a class and not available.  Does report Polina and baby doing well.  Asked pt's mother to give message to Polina about my call, to check on feeding baby.  Left clinic phone number.

## 2020-12-03 LAB — COPATH REPORT: NORMAL

## 2020-12-11 NOTE — ADDENDUM NOTE
Addendum  created 12/10/20 1828 by Ebony Shoemaker MD    Attestation recorded in Intraprocedure, Intraprocedure Attestations filed

## 2020-12-14 NOTE — ADDENDUM NOTE
Addendum  created 12/14/20 1253 by Ebony Shoemaker MD    Attestation recorded in Intraprocedure, Intraprocedure Attestations filed

## 2020-12-14 NOTE — ADDENDUM NOTE
Addendum  created 12/14/20 1252 by Ebony Shoemaker MD    Attestation recorded in Intraprocedure, Intraprocedure Attestations filed

## 2020-12-23 ENCOUNTER — NURSE TRIAGE (OUTPATIENT)
Dept: NURSING | Facility: CLINIC | Age: 20
End: 2020-12-23

## 2020-12-23 ENCOUNTER — HOSPITAL ENCOUNTER (EMERGENCY)
Facility: CLINIC | Age: 20
Discharge: HOME | End: 2020-12-24
Attending: EMERGENCY MEDICINE | Admitting: EMERGENCY MEDICINE
Payer: COMMERCIAL

## 2020-12-23 ENCOUNTER — APPOINTMENT (OUTPATIENT)
Dept: ULTRASOUND IMAGING | Facility: CLINIC | Age: 20
End: 2020-12-23
Attending: EMERGENCY MEDICINE
Payer: COMMERCIAL

## 2020-12-23 DIAGNOSIS — N93.9 VAGINAL BLEEDING: ICD-10-CM

## 2020-12-23 LAB
ABO + RH BLD: NORMAL
ABO + RH BLD: NORMAL
ALBUMIN SERPL-MCNC: 3.5 G/DL (ref 3.4–5)
ALBUMIN UR-MCNC: 10 MG/DL
ALP SERPL-CCNC: 88 U/L (ref 40–150)
ALT SERPL W P-5'-P-CCNC: 20 U/L (ref 0–50)
ANION GAP SERPL CALCULATED.3IONS-SCNC: 6 MMOL/L (ref 3–14)
APPEARANCE UR: ABNORMAL
APTT PPP: 30 SEC (ref 22–37)
AST SERPL W P-5'-P-CCNC: 6 U/L (ref 0–45)
BASOPHILS # BLD AUTO: 0.1 10E9/L (ref 0–0.2)
BASOPHILS NFR BLD AUTO: 0.5 %
BILIRUB SERPL-MCNC: 0.3 MG/DL (ref 0.2–1.3)
BILIRUB UR QL STRIP: NEGATIVE
BLD GP AB SCN SERPL QL: NORMAL
BLOOD BANK CMNT PATIENT-IMP: NORMAL
BUN SERPL-MCNC: 11 MG/DL (ref 7–30)
CALCIUM SERPL-MCNC: 8.8 MG/DL (ref 8.5–10.1)
CHLORIDE SERPL-SCNC: 110 MMOL/L (ref 94–109)
CO2 SERPL-SCNC: 28 MMOL/L (ref 20–32)
COLOR UR AUTO: ABNORMAL
CREAT SERPL-MCNC: 0.56 MG/DL (ref 0.52–1.04)
DIFFERENTIAL METHOD BLD: ABNORMAL
EOSINOPHIL # BLD AUTO: 0.3 10E9/L (ref 0–0.7)
EOSINOPHIL NFR BLD AUTO: 2.7 %
ERYTHROCYTE [DISTWIDTH] IN BLOOD BY AUTOMATED COUNT: 15.5 % (ref 10–15)
GFR SERPL CREATININE-BSD FRML MDRD: >90 ML/MIN/{1.73_M2}
GLUCOSE SERPL-MCNC: 83 MG/DL (ref 70–99)
GLUCOSE UR STRIP-MCNC: NEGATIVE MG/DL
HCG SERPL QL: NEGATIVE
HCT VFR BLD AUTO: 38.8 % (ref 35–47)
HGB BLD-MCNC: 12.2 G/DL (ref 11.7–15.7)
HGB UR QL STRIP: ABNORMAL
IMM GRANULOCYTES # BLD: 0 10E9/L (ref 0–0.4)
IMM GRANULOCYTES NFR BLD: 0.3 %
INR PPP: 1 (ref 0.86–1.14)
KETONES UR STRIP-MCNC: 5 MG/DL
LEUKOCYTE ESTERASE UR QL STRIP: NEGATIVE
LYMPHOCYTES # BLD AUTO: 2.4 10E9/L (ref 0.8–5.3)
LYMPHOCYTES NFR BLD AUTO: 25.8 %
MCH RBC QN AUTO: 26.6 PG (ref 26.5–33)
MCHC RBC AUTO-ENTMCNC: 31.4 G/DL (ref 31.5–36.5)
MCV RBC AUTO: 85 FL (ref 78–100)
MONOCYTES # BLD AUTO: 0.6 10E9/L (ref 0–1.3)
MONOCYTES NFR BLD AUTO: 6.3 %
NEUTROPHILS # BLD AUTO: 5.9 10E9/L (ref 1.6–8.3)
NEUTROPHILS NFR BLD AUTO: 64.4 %
NITRATE UR QL: NEGATIVE
NRBC # BLD AUTO: 0 10*3/UL
NRBC BLD AUTO-RTO: 0 /100
PH UR STRIP: 7 PH (ref 5–7)
PLATELET # BLD AUTO: 262 10E9/L (ref 150–450)
POTASSIUM SERPL-SCNC: 3.6 MMOL/L (ref 3.4–5.3)
PROT SERPL-MCNC: 7.2 G/DL (ref 6.8–8.8)
RBC # BLD AUTO: 4.59 10E12/L (ref 3.8–5.2)
RBC #/AREA URNS AUTO: 4645 /HPF (ref 0–2)
SODIUM SERPL-SCNC: 144 MMOL/L (ref 133–144)
SOURCE: ABNORMAL
SP GR UR STRIP: 1.02 (ref 1–1.03)
SPECIMEN EXP DATE BLD: NORMAL
UROBILINOGEN UR STRIP-MCNC: NORMAL MG/DL (ref 0–2)
WBC # BLD AUTO: 9.1 10E9/L (ref 4–11)
WBC #/AREA URNS AUTO: 8 /HPF (ref 0–5)

## 2020-12-23 PROCEDURE — 87086 URINE CULTURE/COLONY COUNT: CPT | Performed by: EMERGENCY MEDICINE

## 2020-12-23 PROCEDURE — 76856 US EXAM PELVIC COMPLETE: CPT

## 2020-12-23 PROCEDURE — 84703 CHORIONIC GONADOTROPIN ASSAY: CPT | Performed by: EMERGENCY MEDICINE

## 2020-12-23 PROCEDURE — 85730 THROMBOPLASTIN TIME PARTIAL: CPT | Performed by: EMERGENCY MEDICINE

## 2020-12-23 PROCEDURE — 86901 BLOOD TYPING SEROLOGIC RH(D): CPT | Performed by: EMERGENCY MEDICINE

## 2020-12-23 PROCEDURE — 99284 EMERGENCY DEPT VISIT MOD MDM: CPT | Performed by: EMERGENCY MEDICINE

## 2020-12-23 PROCEDURE — 81001 URINALYSIS AUTO W/SCOPE: CPT | Performed by: EMERGENCY MEDICINE

## 2020-12-23 PROCEDURE — 99284 EMERGENCY DEPT VISIT MOD MDM: CPT | Mod: 25 | Performed by: EMERGENCY MEDICINE

## 2020-12-23 PROCEDURE — 80053 COMPREHEN METABOLIC PANEL: CPT | Performed by: EMERGENCY MEDICINE

## 2020-12-23 PROCEDURE — 258N000003 HC RX IP 258 OP 636: Performed by: EMERGENCY MEDICINE

## 2020-12-23 PROCEDURE — 86900 BLOOD TYPING SEROLOGIC ABO: CPT | Performed by: EMERGENCY MEDICINE

## 2020-12-23 PROCEDURE — 96360 HYDRATION IV INFUSION INIT: CPT | Performed by: EMERGENCY MEDICINE

## 2020-12-23 PROCEDURE — 85025 COMPLETE CBC W/AUTO DIFF WBC: CPT | Performed by: EMERGENCY MEDICINE

## 2020-12-23 PROCEDURE — 86850 RBC ANTIBODY SCREEN: CPT | Performed by: EMERGENCY MEDICINE

## 2020-12-23 PROCEDURE — 85610 PROTHROMBIN TIME: CPT | Performed by: EMERGENCY MEDICINE

## 2020-12-23 RX ORDER — SODIUM CHLORIDE 9 MG/ML
INJECTION, SOLUTION INTRAVENOUS CONTINUOUS
Status: DISCONTINUED | OUTPATIENT
Start: 2020-12-23 | End: 2020-12-24 | Stop reason: HOSPADM

## 2020-12-23 RX ADMIN — SODIUM CHLORIDE 1000 ML: 9 INJECTION, SOLUTION INTRAVENOUS at 23:35

## 2020-12-23 NOTE — ED AVS SNAPSHOT
[facility] Emergency Department  [address]  [place] [address]  Phone: [phone]  Fax: [phone]                                    Polina Barrientos   MRN: [id number]    Department: [facility] Emergency Department   Date of Visit: 12/23/2020           After Visit Summary Signature Page    I have received my discharge instructions, and my questions have been answered. I have discussed any challenges I see with this plan with the nurse or doctor.    ..........................................................................................................................................  Patient/Patient Representative Signature      ..........................................................................................................................................  Patient Representative Print Name and Relationship to Patient    ..................................................               ................................................  Date                                   Time    ..........................................................................................................................................  Reviewed by Signature/Title    ...................................................              ..............................................  Date                                               Time          22EPIC Rev 08/18

## 2020-12-24 VITALS
TEMPERATURE: 98.2 F | WEIGHT: 210 LBS | SYSTOLIC BLOOD PRESSURE: 118 MMHG | RESPIRATION RATE: 12 BRPM | OXYGEN SATURATION: 98 % | BODY MASS INDEX: 38.41 KG/M2 | DIASTOLIC BLOOD PRESSURE: 75 MMHG | HEART RATE: 69 BPM

## 2020-12-24 PROCEDURE — 99214 OFFICE O/P EST MOD 30 MIN: CPT | Mod: 24 | Performed by: OBSTETRICS & GYNECOLOGY

## 2020-12-24 PROCEDURE — 96361 HYDRATE IV INFUSION ADD-ON: CPT | Performed by: EMERGENCY MEDICINE

## 2020-12-24 PROCEDURE — 258N000003 HC RX IP 258 OP 636: Performed by: EMERGENCY MEDICINE

## 2020-12-24 RX ADMIN — SODIUM CHLORIDE: 9 INJECTION, SOLUTION INTRAVENOUS at 00:28

## 2020-12-24 NOTE — ED NOTES
Emergency Department Patient Sign-out       Brief HPI:  This is a 20 year old female signed out to me by Dr. Henderson .  See initial ED Provider note for details of the presentation.            Significant Events prior to my assuming care: Patient one month post partum. Changing pad1x/hr. Worsening over a week. Hgb ok. US w/out retained products. OB to see.      Exam:   Patient Vitals for the past 24 hrs:   BP Temp Temp src Pulse Resp SpO2 Weight   12/23/20 2335 118/75 -- -- 76 -- -- --   12/23/20 2330 121/67 -- -- -- -- -- --   12/23/20 2214 119/66 98.2  F (36.8  C) Oral 102 14 98 % 95.3 kg (210 lb)           ED RESULTS:   Results for orders placed or performed during the hospital encounter of 12/23/20 (from the past 24 hour(s))   CBC with platelets differential     Status: Abnormal    Collection Time: 12/23/20 10:31 PM   Result Value Ref Range    WBC 9.1 4.0 - 11.0 10e9/L    RBC Count 4.59 3.8 - 5.2 10e12/L    Hemoglobin 12.2 11.7 - 15.7 g/dL    Hematocrit 38.8 35.0 - 47.0 %    MCV 85 78 - 100 fl    MCH 26.6 26.5 - 33.0 pg    MCHC 31.4 (L) 31.5 - 36.5 g/dL    RDW 15.5 (H) 10.0 - 15.0 %    Platelet Count 262 150 - 450 10e9/L    Diff Method Automated Method     % Neutrophils 64.4 %    % Lymphocytes 25.8 %    % Monocytes 6.3 %    % Eosinophils 2.7 %    % Basophils 0.5 %    % Immature Granulocytes 0.3 %    Nucleated RBCs 0 0 /100    Absolute Neutrophil 5.9 1.6 - 8.3 10e9/L    Absolute Lymphocytes 2.4 0.8 - 5.3 10e9/L    Absolute Monocytes 0.6 0.0 - 1.3 10e9/L    Absolute Eosinophils 0.3 0.0 - 0.7 10e9/L    Absolute Basophils 0.1 0.0 - 0.2 10e9/L    Abs Immature Granulocytes 0.0 0 - 0.4 10e9/L    Absolute Nucleated RBC 0.0    Comprehensive metabolic panel     Status: Abnormal    Collection Time: 12/23/20 10:31 PM   Result Value Ref Range    Sodium 144 133 - 144 mmol/L    Potassium 3.6 3.4 - 5.3 mmol/L    Chloride 110 (H) 94 - 109 mmol/L    Carbon Dioxide 28 20 - 32 mmol/L    Anion Gap 6 3 - 14 mmol/L    Glucose 83  70 - 99 mg/dL    Urea Nitrogen 11 7 - 30 mg/dL    Creatinine 0.56 0.52 - 1.04 mg/dL    GFR Estimate >90 >60 mL/min/[1.73_m2]    GFR Estimate If Black >90 >60 mL/min/[1.73_m2]    Calcium 8.8 8.5 - 10.1 mg/dL    Bilirubin Total 0.3 0.2 - 1.3 mg/dL    Albumin 3.5 3.4 - 5.0 g/dL    Protein Total 7.2 6.8 - 8.8 g/dL    Alkaline Phosphatase 88 40 - 150 U/L    ALT 20 0 - 50 U/L    AST 6 0 - 45 U/L   INR     Status: None    Collection Time: 12/23/20 10:31 PM   Result Value Ref Range    INR 1.00 0.86 - 1.14   ABO/Rh type and screen     Status: None    Collection Time: 12/23/20 10:31 PM   Result Value Ref Range    ABO O     RH(D) Pos     Antibody Screen Neg     Test Valid Only At          [facility]    Specimen Expires 12/26/2020    Partial thromboplastin time     Status: None    Collection Time: 12/23/20 10:31 PM   Result Value Ref Range    PTT 30 22 - 37 sec   HCG qualitative Blood     Status: None    Collection Time: 12/23/20 10:31 PM   Result Value Ref Range    HCG Qualitative Serum Negative NEG^Negative   UA with Microscopic     Status: Abnormal    Collection Time: 12/23/20 10:46 PM   Result Value Ref Range    Color Urine Dark Yellow     Appearance Urine Cloudy     Glucose Urine Negative NEG^Negative mg/dL    Bilirubin Urine Negative NEG^Negative    Ketones Urine 5 (A) NEG^Negative mg/dL    Specific Gravity Urine 1.025 1.003 - 1.035    Blood Urine Large (A) NEG^Negative    pH Urine 7.0 5.0 - 7.0 pH    Protein Albumin Urine 10 (A) NEG^Negative mg/dL    Urobilinogen mg/dL Normal 0.0 - 2.0 mg/dL    Nitrite Urine Negative NEG^Negative    Leukocyte Esterase Urine Negative NEG^Negative    Source Midstream Urine     WBC Urine 8 0 - 5 /HPF    RBC Urine 4645 0 - 2 /HPF   US Pelvis Cmplt w Transvag & Doppler LmtPel Duplex Limited     Status: None    Collection Time: 12/23/20 11:25 PM    Narrative    EXAM: ULTRASOUND PELVIS WITH ENDOVAGINAL IMAGING  LOCATION: [facility]  Services  DATE/TIME: 12/23/2020 11:12 PM    INDICATION: One month postpartum. Vaginal bleeding and abdominal pain.  COMPARISON: None.    TECHNIQUE: Transabdominal scans were performed. Endovaginal ultrasound was performed to better visualize the adnexa.    FINDINGS:    UTERUS: 9.2 x 5.0 x 5.8 cm in long axis, short axis and transverse dimensions respectively. No myometrial masses.    ENDOMETRIUM: Slightly heterogeneous, measuring 0.7 cm in thickness. No blood flow is visualized within the endometrial stripe.    RIGHT OVARY: Not visualized.    LEFT OVARY: Not visualized.    OTHER: No adnexal masses. A trace amount of simple-appearing free fluid in the pelvis.      Impression    IMPRESSION:   1.  The endometrial stripe slightly heterogeneous, measuring 0.7 cm in thickness. There is no convincing evidence of retained products of conception, but they cannot be entirely excluded with this thickness of endometrium.  2.  The ovaries are not visualized.  3.  A trace amount of nonspecific free fluid in the pelvis.       ED MEDICATIONS:   Medications   0.9% sodium chloride BOLUS (0 mLs Intravenous Stopped 12/24/20 0028)     Followed by   sodium chloride 0.9% infusion ( Intravenous New Bag 12/24/20 0028)         Impression:    ICD-10-CM    1. Vaginal bleeding  N93.9 Urine Culture Aerobic Bacterial       Plan:    OB saw the patient, believe this is start of menstrual cycle. She was offered birth control, patient undecided. US reviewed, no POC. Patient to follow up with OB as outpatient, she was given return precautions.        MD Fausto Messina, Gopi Stallworth MD  12/24/20 0116

## 2020-12-24 NOTE — CONSULTS
19yo  s/p  20 with the S CNMs after prenatal care at [facility] presents to ED with start of vaginal bleeding today which seemed to increase in volume as the day continued.   Since being in ED has not soaked a pad   Denies nausea, emesis, fevers, chills, foul discharge  Has not been sexually active since delivery  Has discussed with B doc starting contraceptives but hasn't yet.    Patient Active Problem List   Diagnosis     Major depression     Suicidal ideation     Encounter for triage in pregnant patient     Leakage of amniotic fluid      (normal spontaneous vaginal delivery)     Anemia due to blood loss, acute     Past Medical History:   Diagnosis Date     Asthma     does not use inhaler, dx'd      Depressive disorder     h/o depression and suidide attempt    No past surgical history on file.  OB History    Para Term  AB Living   1 1 1 0 0 1   SAB TAB Ectopic Multiple Live Births   0 0 0 0 1      # Outcome Date GA Lbr Marco Antonio/2nd Weight Sex Delivery Anes PTL Lv   1 Term 20 39w2d 04:47 / 00:13 3.033 kg (6 lb 11 oz) F Vag-Spont EPI N RAGINI      Name: CHASITY,FEMALE-CAIN      Apgar1: 9  Apgar5: 9     /75   Pulse 76   Temp 98.2  F (36.8  C) (Oral)   Resp 14   Wt 95.3 kg (210 lb)   LMP 2020   SpO2 98%   Breastfeeding Yes   BMI 38.41 kg/m    Appears well, laying on side sleeping  Abdomen soft NT ND  Uterus nonpalpable above symphysis  LE without erythema or edema    Ultrasound and lab results reviewed in detail and with patient    A/P:   4 weeks s/p     First menses without evidence of blood loss anemia or infection    Offered OCPs to reduce menstrual flow  Patient declines at this time and will contact her [facility] doc     Magalis Muñoz MD

## 2020-12-24 NOTE — ED PROVIDER NOTES
[facility] EMERGENCY DEPARTMENT ([facility])    20     ED 5 10:33 PM    History     Chief Complaint   Patient presents with     Vaginal Bleeding     pt had baby  by SWATHI py after that pt had ongoing spotting and on  started bleeding like a peroid hen 2 days later bleeding became heavier     The history is provided by the patient and medical records.     Polina Barrientos is a 20 year old female who is  s/p normal spontaneous vaginal delivery on 2020 who presents with heavy vaginal bleeding.  She states that her lochia stopped 2 weeks ago, had cessation of vaginal bleeding for a week and then developed recurrence of vaginal bleeding on 20. She thought that this was perhaps return of her menses. However the vaginal bleeding started to get heavier over the past 2 days to the point where she is changing pads every hour. She has been passing small clots as well. She contacted OB clinic and was told to come to the ER for evaluation. She has some abdominal cramping. Patient notes having abdominal tenderness with palpitation last week on a routine clinic visit, but this has resolved. She had mild burning dysuria earlier today, otherwise no difficulty with urinating. No back pain other than at epidural site but this comes and goes and does not concern her. She was taking Tylenol and ibuprofen as her wisdom tooth is coming in in her left lower jaw and a cracked tooth on upper jaw, last dose was this morning.  She states that her pregnancy had no complications and that her baby is doing well. No fever, nausea, vomiting, diarrhea, constipation, shortness of breath, no exposure to COVID-19. No other medical problems. She denies chance of pregnancy as she has not had any recent sexual intercourse. No smoking, drug or alcohol use.    I have reviewed the Medications, Allergies, Past Medical and Surgical History, and Social History in the [employer] system.  Past Medical History:   Diagnosis  Date     Asthma     does not use inhaler, dx'd 2/13     Depressive disorder 2015    h/o depression and suidide attempt 2015       No past surgical history on file.  Past medical history, past surgical history, medications, and allergies were reviewed with the patient. Additional pertinent items: None    No family history on file.    Social History     Tobacco Use     Smoking status: Never Smoker     Smokeless tobacco: Never Used   Substance Use Topics     Alcohol use: Not Currently     Comment: occasionally      Social history was reviewed with the patient. Additional pertinent items: None  REVIEW OF SYSTEMS  Pertinent positives and negatives are documented in the HPI. All other systems reviewed and are negative.    Physical Exam   BP: 119/66  Pulse: 102  Temp: 98.2  F (36.8  C)  Resp: 14  Weight: 95.3 kg (210 lb)  SpO2: 98 %      Physical Exam  Vitals signs reviewed.   Constitutional:       General: She is not in acute distress.     Appearance: She is well-developed.   HENT:      Head: Normocephalic and atraumatic.      Mouth/Throat:      Mouth: Mucous membranes are moist.   Eyes:      Extraocular Movements: Extraocular movements intact.      Pupils: Pupils are equal, round, and reactive to light.   Neck:      Musculoskeletal: Normal range of motion and neck supple.   Cardiovascular:      Rate and Rhythm: Regular rhythm. Tachycardia present.      Pulses: Normal pulses.      Heart sounds: Normal heart sounds. No murmur.      Comments: Mild tachycardia at 102 bpm.  Pulmonary:      Effort: Pulmonary effort is normal. No respiratory distress.      Breath sounds: Normal breath sounds. No wheezing or rales.   Abdominal:      General: Bowel sounds are normal. There is no distension.      Palpations: Abdomen is soft. There is no mass.      Tenderness: There is no right CVA tenderness, left CVA tenderness, guarding or rebound.      Comments: Mild suprapubic tenderness on exam.   Genitourinary:     Comments: Alicia PRASAD chaperone  for the external exam in the room.  Speculum exam deferred to the OB team.  External exam reveals normal-appearing external genitalia.  Small amount of dark blood pooling in the vaginal vault.  No significant profuse bleeding noted.  Musculoskeletal: Normal range of motion.         General: No swelling or tenderness.   Skin:     General: Skin is warm and dry.      Capillary Refill: Capillary refill takes less than 2 seconds.      Findings: No rash.   Neurological:      General: No focal deficit present.      Mental Status: She is alert and oriented to person, place, and time.      GCS: GCS eye subscore is 4. GCS verbal subscore is 5. GCS motor subscore is 6.      Cranial Nerves: No cranial nerve deficit.      Sensory: No sensory deficit.      Motor: No weakness.   Psychiatric:         Mood and Affect: Mood normal.         ED Course     10:30 PM  The patient was seen and examined by Dr. Henderson in ED 7.         Procedures                    Results for orders placed or performed during the hospital encounter of 12/23/20 (from the past 24 hour(s))   CBC with platelets differential   Result Value Ref Range    WBC 9.1 4.0 - 11.0 10e9/L    RBC Count 4.59 3.8 - 5.2 10e12/L    Hemoglobin 12.2 11.7 - 15.7 g/dL    Hematocrit 38.8 35.0 - 47.0 %    MCV 85 78 - 100 fl    MCH 26.6 26.5 - 33.0 pg    MCHC 31.4 (L) 31.5 - 36.5 g/dL    RDW 15.5 (H) 10.0 - 15.0 %    Platelet Count 262 150 - 450 10e9/L    Diff Method Automated Method     % Neutrophils 64.4 %    % Lymphocytes 25.8 %    % Monocytes 6.3 %    % Eosinophils 2.7 %    % Basophils 0.5 %    % Immature Granulocytes 0.3 %    Nucleated RBCs 0 0 /100    Absolute Neutrophil 5.9 1.6 - 8.3 10e9/L    Absolute Lymphocytes 2.4 0.8 - 5.3 10e9/L    Absolute Monocytes 0.6 0.0 - 1.3 10e9/L    Absolute Eosinophils 0.3 0.0 - 0.7 10e9/L    Absolute Basophils 0.1 0.0 - 0.2 10e9/L    Abs Immature Granulocytes 0.0 0 - 0.4 10e9/L    Absolute Nucleated RBC 0.0      Medications   0.9% sodium  chloride BOLUS (has no administration in time range)     Followed by   sodium chloride 0.9% infusion (has no administration in time range)              Assessments & Plan (with Medical Decision Making)   Patient presents with vaginal bleeding approximately 1 month postpartum from a normal spontaneous vaginal delivery.  She reports that her pregnancy was uncomplicated.  She states she was having lochia appropriate postpartum until about 2 weeks ago when she stopped bleeding for approximately a week.  She then started spotting again and noted heavy.  Light bleeding that has now occurred over the past week.  She states that then over the past 2 days she has been having even heavier bleeding changing her pad every hour.  She states she is passing small katherine-sized clots.  She has had some mild abdominal cramping pain.  No other significant symptoms.  She denies any recent sexual intercourse.  Here, she is overall well-appearing.  She is mildly tachycardic at 102 bpm.  She is afebrile and her blood pressure is within normal limits.  She is in no acute distress.  She has some mild suprapubic cramping tenderness on exam but no signs of peritonitis.  External vaginal exam was performed which revealed some dark blood pooling in the vaginal vault without profuse bleeding.  Laboratory studies were initiated including a type and screen.  Transabdominal and transvaginal ultrasound was also ordered.  IV access was established and the patient was given IV fluids.  She declined any pain medication at this time.  Differential diagnosis includes but is not limited to retained products of conception, return of menstrual period however be very quick after this pregnancy, cervix laceration, uterine atony would be very unlikely as she is a month postpartum.  OB/GYN was contacted and will come evaluate the patient. Laboratory studies are largely unremarkable.  CMP was unremarkable.  CBC reveals hemoglobin of 12.2.  White blood cell count  is 9.1.  Coagulation studies are within normal limits.  Pregnancy test negative.  Urinalysis obviously reveals blood for her known vaginal bleeding with 4000 red blood cells.  She only has 8 white blood cells and in the setting of that amount of red blood cells I have low suspicion for true infection.  Urine culture will be sent and she will be contacted if this grows any bacteria.  US revealed The endometrial stripe is slightly heterogeneous measuring 0.7 cm in thickness.  There is no convincing evidence of retained products of conception but they cannot be entirely excluded with this thickness of endometrium.      Patient's tachycardia improved with IV fluids.  The remainder of her vital signs remained within normal limits.  At this time, she is awaiting OB/GYN evaluation and recommendations. Patient will be signed out to my colleague Dr. Lambert pending OB/GYN evaluation. Please see his note for further management and final disposition. Patient was stable upon my last interaction.     I have reviewed the nursing notes.    I have reviewed the findings, diagnosis, plan and need for follow up with the patient.    New Prescriptions    No medications on file       Final diagnoses:   Vaginal bleeding       12/23/2020   [facility] EMERGENCY DEPARTMENT    I, Amber Patrick, am serving as a trained medical scribe to document services personally performed by Aye Henderson MD based on the provider's statements to me on 12/23/20.  This document has been checked and approved by the attending provider.    I, Aye Henderson MD, was physically present and have reviewed and verified the accuracy of this note documented by Amber Patrick, medical scribe.        Aye Henderson MD  12/24/20 0055

## 2020-12-24 NOTE — DISCHARGE INSTRUCTIONS
Please make an appointment to follow up with OB/Gyn - [facility] (phone: [phone]) if you have any concerns    Call your OB doctor as discussed about decision to start birth control    Return to the emergency department if your bleeding worsens, if you develop chest pain, shortness of breath, dizziness, fainting, fevers or if you have any further concerns.

## 2020-12-24 NOTE — TELEPHONE ENCOUNTER
Post partum one month, delivered at [facility], reports she started bleeding on 12/18 or 12/19 like normal vaginal bleeding during a period. Reports over the last 2 days, her bleeding has become heavier. Patient reports she is soaking about 1 pad per hour since she woke up this morning. Intermittent abdominal pain. No fever.Advised per protocol to be evaluated in the emergency department within 4 hours. Caller verbalized understanding and had no further questions.     Marisabel Cerna RN/[facility]          COVID 19 Nurse Triage Plan/Patient Instructions    Please be aware that novel coronavirus (COVID-19) may be circulating in the community. If you develop symptoms such as fever, cough, or SOB or if you have concerns about the presence of another infection including coronavirus (COVID-19), please contact your health care provider or visit [email or web address].     Disposition/Instructions    ED Visit recommended. Follow protocol based instructions.     Bring Your Own Device:  Please also bring your smart device(s) (smart phones, tablets, laptops) and their charging cables for your personal use and to communicate with your care team during your visit.    Thank you for taking steps to prevent the spread of this virus.  o Limit your contact with others.  o Wear a simple mask to cover your cough.  o Wash your hands well and often.    Resources    [facility]: About COVID-19: [email or web address]    CDC: What to Do If You're Sick: www.cdc.gov/coronavirus/2019-ncov/about/steps-when-sick.html    CDC: Ending Home Isolation: www.cdc.gov/coronavirus/2019-ncov/hcp/disposition-in-home-patients.html     CDC: Caring for Someone: www.cdc.gov/coronavirus/2019-ncov/if-you-are-sick/care-for-someone.html     [facility]: Interim Guidance for Hospital Discharge to Home: www.health.[facility].[place]/diseases/coronavirus/hcp/hospdischarge.pdf    [facility] clinical trials (COVID-19 research  studies): clinicalaffairs.[facility]-clinical-trials     Below are the COVID-19 hotlines at the [place] Department of Health ([facility]). Interpreters are available.   o For health questions: Call 138-071-0091 or 1-269.347.8620 (7 a.m. to 7 p.m.)  o For questions about schools and childcare: Call 293-656-9323 or 1-446.423.3543 (7 a.m. to 7 p.m.)     Additional Information    Negative: Shock suspected (e.g., cold/pale/clammy skin, too weak to stand, low BP, rapid pulse)    Negative: Difficult to awaken or acting confused (e.g., disoriented, slurred speech)    Negative: Passed out (i.e., lost consciousness, collapsed and was not responding)    Negative: Sounds like a life-threatening emergency to the triager    Negative: SEVERE dizziness (e.g., unable to stand, requires support to walk, feels like passing out now)    Negative: [1] SEVERE abdominal pain AND [2] present > 1 hour    Negative: Fever > 100.4 F (38.0 C)    Negative: SEVERE vaginal bleeding (i.e., soaking 2 pads or tampons per hour and present 2 or more hours)    Negative: Patient sounds very sick or weak to the triager    MODERATE vaginal bleeding (i.e., soaking 1 pad or tampon per hour and present > 6 hours)    Protocols used: POSTPARTUM - VAGINAL BLEEDING AND LOCHIA-A-AH

## 2020-12-25 LAB
BACTERIA SPEC CULT: NO GROWTH
Lab: NORMAL
SPECIMEN SOURCE: NORMAL

## 2022-03-02 ENCOUNTER — TRANSFERRED RECORDS (OUTPATIENT)
Dept: HEALTH INFORMATION MANAGEMENT | Facility: CLINIC | Age: 22
End: 2022-03-02

## 2022-03-17 ENCOUNTER — PRENATAL OFFICE VISIT (OUTPATIENT)
Dept: NURSING | Facility: CLINIC | Age: 22
End: 2022-03-17
Payer: COMMERCIAL

## 2022-03-17 VITALS — HEIGHT: 62 IN | BODY MASS INDEX: 38.41 KG/M2

## 2022-03-17 DIAGNOSIS — Z34.90 ENCOUNTER FOR SUPERVISION OF NORMAL PREGNANCY: Primary | ICD-10-CM

## 2022-03-17 DIAGNOSIS — Z91.199 NO-SHOW FOR APPOINTMENT: ICD-10-CM

## 2022-03-17 RX ORDER — NALOXONE HYDROCHLORIDE 4 MG/.1ML
SPRAY NASAL
COMMUNITY
Start: 2022-03-09 | End: 2022-03-31

## 2022-03-17 RX ORDER — PRENATAL VIT/IRON FUM/FOLIC AC 27MG-0.8MG
1 TABLET ORAL DAILY
COMMUNITY
Start: 2021-07-07 | End: 2022-03-31

## 2022-03-17 RX ORDER — SERTRALINE HYDROCHLORIDE 100 MG/1
TABLET, FILM COATED ORAL
Status: ON HOLD | COMMUNITY
Start: 2022-01-05 | End: 2022-11-04

## 2022-03-17 NOTE — PROGRESS NOTES
This patient was a no show for this scheduled appointment.    Left message twice, needs to reschedule.      Kirsten Hardin LPN

## 2022-03-31 ENCOUNTER — OFFICE VISIT (OUTPATIENT)
Dept: OBGYN | Facility: CLINIC | Age: 22
End: 2022-03-31
Payer: COMMERCIAL

## 2022-03-31 ENCOUNTER — ANCILLARY PROCEDURE (OUTPATIENT)
Dept: ULTRASOUND IMAGING | Facility: CLINIC | Age: 22
End: 2022-03-31
Attending: ADVANCED PRACTICE MIDWIFE
Payer: COMMERCIAL

## 2022-03-31 ENCOUNTER — TELEPHONE (OUTPATIENT)
Dept: MIDWIFE SERVICES | Facility: CLINIC | Age: 22
End: 2022-03-31

## 2022-03-31 ENCOUNTER — PRENATAL OFFICE VISIT (OUTPATIENT)
Dept: NURSING | Facility: CLINIC | Age: 22
End: 2022-03-31
Payer: COMMERCIAL

## 2022-03-31 VITALS — WEIGHT: 234 LBS | BODY MASS INDEX: 43.06 KG/M2 | HEIGHT: 62 IN

## 2022-03-31 DIAGNOSIS — Z34.90 EARLY STAGE OF PREGNANCY: Primary | ICD-10-CM

## 2022-03-31 DIAGNOSIS — F11.20: ICD-10-CM

## 2022-03-31 DIAGNOSIS — Z23 NEED FOR TDAP VACCINATION: ICD-10-CM

## 2022-03-31 DIAGNOSIS — Z34.01 ENCOUNTER FOR SUPERVISION OF NORMAL FIRST PREGNANCY IN FIRST TRIMESTER: Primary | ICD-10-CM

## 2022-03-31 DIAGNOSIS — O99.320: ICD-10-CM

## 2022-03-31 DIAGNOSIS — Z34.90 ENCOUNTER FOR SUPERVISION OF NORMAL PREGNANCY: Primary | ICD-10-CM

## 2022-03-31 DIAGNOSIS — Z34.90 ENCOUNTER FOR SUPERVISION OF NORMAL PREGNANCY: ICD-10-CM

## 2022-03-31 DIAGNOSIS — R11.0 NAUSEA: ICD-10-CM

## 2022-03-31 PROBLEM — O42.90 LEAKAGE OF AMNIOTIC FLUID: Status: RESOLVED | Noted: 2020-11-18 | Resolved: 2022-03-31

## 2022-03-31 PROCEDURE — 76801 OB US < 14 WKS SINGLE FETUS: CPT | Performed by: OBSTETRICS & GYNECOLOGY

## 2022-03-31 PROCEDURE — 99203 OFFICE O/P NEW LOW 30 MIN: CPT | Mod: TEL | Performed by: OBSTETRICS & GYNECOLOGY

## 2022-03-31 PROCEDURE — 99207 PR NO CHARGE NURSE ONLY: CPT

## 2022-03-31 RX ORDER — PYRIDOXINE HCL (VITAMIN B6) 25 MG
25 TABLET ORAL 3 TIMES DAILY PRN
Qty: 180 TABLET | Refills: 0 | Status: SHIPPED | OUTPATIENT
Start: 2022-03-31 | End: 2022-04-08

## 2022-03-31 RX ORDER — PRENATAL VIT/IRON FUM/FOLIC AC 27MG-0.8MG
1 TABLET ORAL DAILY
Qty: 90 TABLET | Refills: 3 | Status: SHIPPED | OUTPATIENT
Start: 2022-03-31

## 2022-03-31 RX ORDER — PYRIDOXINE HCL (VITAMIN B6) 25 MG
25 TABLET ORAL 3 TIMES DAILY
Status: CANCELLED | OUTPATIENT
Start: 2022-03-31

## 2022-03-31 RX ORDER — PRENATAL VIT/IRON FUM/FOLIC AC 27MG-0.8MG
1 TABLET ORAL DAILY
Qty: 90 TABLET | Refills: 3 | Status: CANCELLED | OUTPATIENT
Start: 2022-03-31

## 2022-03-31 RX ORDER — ONDANSETRON 4 MG/1
4 TABLET, ORALLY DISINTEGRATING ORAL EVERY 6 HOURS PRN
Qty: 20 TABLET | Refills: 3 | Status: SHIPPED | OUTPATIENT
Start: 2022-03-31 | End: 2022-06-07

## 2022-03-31 NOTE — PROGRESS NOTES
Phone visit with patient    Phone call to review ultrasound results. Discussed ultrsound today measuring 8w4d and by LMP should be 9w2d. Since this is only 5 days different will keep EDC 11/1/2022.    She is having a lot of nausea and was told would have zofran sent to pharmacy. Looks like b6 and unisom sent.    (Z34.90) Early stage of pregnancy  (primary encounter diagnosis)  Comment: dates by LMP  Plan: discussed dating and she understands. Low risk of SAB    (R11.0) Nausea  Plan: ondansetron (ZOFRAN-ODT) 4 MG ODT tab        Has used zofran for prior pregnancy and requests today, script was sent. Has NOB scheduled with KEVIN Boyle MD      Total time: 5 min

## 2022-03-31 NOTE — TELEPHONE ENCOUNTER
Patient requesting prenatal vitamins, B6, Unisom to the updated pharmacy    Kirsten Hardin LPN

## 2022-03-31 NOTE — PROGRESS NOTES
Important Information for Provider:     New ob nurse intake by phone, second pregnancy. Patient has history of drug abuse, taking Suboxone which she receives from the [facility]. She was taking Zoloft but stopped 1 month ago, encouraged her to start taking medication again which she gets for the [facility]. She states she will ask for another RX when she goes in next. Ultrasound scheduled for today with Dr Boyle to call with rebecca. STEPHEN with KEVIN  4/08/2022.  TE sent to [facility] to sent Rx for prenatal vitamins, B6 Unisom    Caffeine intake/servings daily - 2 sodas  Calcium intake/servings daily - 3  Exercise 5 times weekly - describe ; walks, precautions given  Sunscreen used - Yes  Seatbelts used - Yes  Guns stored in the home - No  Self Breast Exam - No  Pap test up to date -  No  Immunizations reviewed and up to date - Yes  Abuse: Current or Past (Physical, Sexual or Emotional) - No  Do you feel safe in your environment - Yes  Do you cope well with stress - Yes  Do you suffer from insomnia - No        Prenatal OB Questionnaire  Patient supplied answers from flow sheet for:  Prenatal OB Questionnaire.  Past Medical History  Have you ever received care for your mental health? : (!) Yes  Have you ever been in a major accident or suffered serious trauma?: No  Within the last year, has anyone hit, slapped, kicked or otherwise hurt you?: No  In the last year, has anyone forced you to have sex when you didn't want to?: No    Past Medical History 2   Have you ever received a blood transfusion?: No  Would you accept a blood transfusion if was medically recommended?: (!) No (unsure)  Does anyone in your home smoke?: No   Is your blood type Rh negative?: No  Have you ever ?: (!) Yes  Have you been hospitalized for a nonsurgical reason excluding normal delivery?: No  Have you ever had an abnormal pap smear?: No    Past Medical History (Continued)  Do you have a history of abnormalities of the  uterus?: No  Did your mother take ANA ROSA or any other hormones when she was pregnant with you?: No  Do you have any other problems we have not asked about which you feel may be important to this pregnancy?: No                     Allergies as of 3/31/2022:    Allergies as of 03/31/2022     (No Known Allergies)       Current medications are:  Current Outpatient Medications   Medication Sig Dispense Refill     acetaminophen (TYLENOL) 325 MG tablet Take 2 tablets (650 mg) by mouth every 6 hours as needed for mild pain Start after Delivery. 100 tablet 0     ALBUTEROL INHALER 17GM Inhale  into the lungs. This product no longer available       sertraline (ZOLOFT) 100 MG tablet TAKE 1 TABLET BY MOUTH EVERY DAY IN ADDITION TO 50 MG TABLET FOR TOTAL DAILY DOSE  MG (Patient not taking: Reported on 3/31/2022)       sertraline (ZOLOFT) 50 MG tablet TAKE 1 TABLET BY MOUTH EVERY DAY IN ADDITION  MG TABLET FOR TOTAL DAILY DOSE  MG (Patient not taking: Reported on 3/31/2022)           Early ultrasound screening tool:    Does patient have irregular periods?  No  Did patient use hormonal birth control in the three months prior to positive urine pregnancy test? No  Is the patient breastfeeding?  No  Is the patient 10 weeks or greater at time of education visit?  No

## 2022-04-08 ENCOUNTER — PRENATAL OFFICE VISIT (OUTPATIENT)
Dept: MIDWIFE SERVICES | Facility: CLINIC | Age: 22
End: 2022-04-08
Payer: COMMERCIAL

## 2022-04-08 VITALS
SYSTOLIC BLOOD PRESSURE: 110 MMHG | DIASTOLIC BLOOD PRESSURE: 59 MMHG | WEIGHT: 227 LBS | TEMPERATURE: 98.6 F | HEART RATE: 69 BPM | BODY MASS INDEX: 41.52 KG/M2

## 2022-04-08 DIAGNOSIS — Z34.81 ENCOUNTER FOR SUPERVISION OF OTHER NORMAL PREGNANCY, FIRST TRIMESTER: Primary | ICD-10-CM

## 2022-04-08 LAB
ABO/RH(D): NORMAL
ALBUMIN UR-MCNC: ABNORMAL MG/DL
ANTIBODY SCREEN: NEGATIVE
APPEARANCE UR: CLEAR
BILIRUB UR QL STRIP: NEGATIVE
COLOR UR AUTO: YELLOW
ERYTHROCYTE [DISTWIDTH] IN BLOOD BY AUTOMATED COUNT: 15.6 % (ref 10–15)
GLUCOSE UR STRIP-MCNC: NEGATIVE MG/DL
HCT VFR BLD AUTO: 37.6 % (ref 35–47)
HGB BLD-MCNC: 12.3 G/DL (ref 11.7–15.7)
HGB UR QL STRIP: ABNORMAL
KETONES UR STRIP-MCNC: NEGATIVE MG/DL
LEUKOCYTE ESTERASE UR QL STRIP: ABNORMAL
MCH RBC QN AUTO: 28 PG (ref 26.5–33)
MCHC RBC AUTO-ENTMCNC: 32.7 G/DL (ref 31.5–36.5)
MCV RBC AUTO: 86 FL (ref 78–100)
NITRATE UR QL: POSITIVE
PH UR STRIP: 5.5 [PH] (ref 5–7)
PLATELET # BLD AUTO: 248 10E3/UL (ref 150–450)
RBC # BLD AUTO: 4.4 10E6/UL (ref 3.8–5.2)
SP GR UR STRIP: >=1.03 (ref 1–1.03)
SPECIMEN EXPIRATION DATE: NORMAL
UROBILINOGEN UR STRIP-ACNC: 2 E.U./DL
WBC # BLD AUTO: 9.2 10E3/UL (ref 4–11)

## 2022-04-08 PROCEDURE — 86780 TREPONEMA PALLIDUM: CPT | Performed by: ADVANCED PRACTICE MIDWIFE

## 2022-04-08 PROCEDURE — 86762 RUBELLA ANTIBODY: CPT | Performed by: ADVANCED PRACTICE MIDWIFE

## 2022-04-08 PROCEDURE — 86850 RBC ANTIBODY SCREEN: CPT | Performed by: ADVANCED PRACTICE MIDWIFE

## 2022-04-08 PROCEDURE — 87340 HEPATITIS B SURFACE AG IA: CPT | Performed by: ADVANCED PRACTICE MIDWIFE

## 2022-04-08 PROCEDURE — 87389 HIV-1 AG W/HIV-1&-2 AB AG IA: CPT | Performed by: ADVANCED PRACTICE MIDWIFE

## 2022-04-08 PROCEDURE — 86901 BLOOD TYPING SEROLOGIC RH(D): CPT | Performed by: ADVANCED PRACTICE MIDWIFE

## 2022-04-08 PROCEDURE — 87186 SC STD MICRODIL/AGAR DIL: CPT | Performed by: ADVANCED PRACTICE MIDWIFE

## 2022-04-08 PROCEDURE — 81003 URINALYSIS AUTO W/O SCOPE: CPT | Performed by: ADVANCED PRACTICE MIDWIFE

## 2022-04-08 PROCEDURE — 36415 COLL VENOUS BLD VENIPUNCTURE: CPT | Performed by: ADVANCED PRACTICE MIDWIFE

## 2022-04-08 PROCEDURE — 86803 HEPATITIS C AB TEST: CPT | Performed by: ADVANCED PRACTICE MIDWIFE

## 2022-04-08 PROCEDURE — 86900 BLOOD TYPING SEROLOGIC ABO: CPT | Performed by: ADVANCED PRACTICE MIDWIFE

## 2022-04-08 PROCEDURE — 99212 OFFICE O/P EST SF 10 MIN: CPT | Performed by: ADVANCED PRACTICE MIDWIFE

## 2022-04-08 PROCEDURE — 87086 URINE CULTURE/COLONY COUNT: CPT | Performed by: ADVANCED PRACTICE MIDWIFE

## 2022-04-08 PROCEDURE — 85027 COMPLETE CBC AUTOMATED: CPT | Performed by: ADVANCED PRACTICE MIDWIFE

## 2022-04-08 NOTE — PROGRESS NOTES
"10w3d  Polina is expecting her 2nd baby. She reports she is excited for this pregnancy and baby. She is not accompanied by her partner Herve today. She feels like she is doing well except for having insomnia and feeling super tired. Her daughter, who is 14 months old, is not sleeping through the night anymore. Also Polina finds it hard to stay asleep for more than 3 or 4 hours at a time. She goes to the [facility] for her general care and suboxone. She reports a hx of opioid use prior to her first child and stable w/ suboxone for the past several years.  Her providers at [facility] recommended trying unisom for sleep at night and I agree. She has tried this only once. She reports a stable mood. Taking 150mg of zoloft for depression. States that she is doing well other than her sleep schedule. Denies nausea or vomiting. We discussed [facility] at our clinic for therapy and she accepts the referral stating \"I do need to find a therapist\". We discussed genetic testing and at this point she declines. Denies 7 lb weight loss. States that her weight fluctuates from 227 to 234 often- she has it taken at weekly appointments at the [facility] and always feels the same despite the scale saying she is having weigh fluctuations. Wonders if it is the scale there or her clothes. She reports a healthy first pregnancy and a good experience with delivery and birth.     She needs a pap smear but declines it today because she \"is way to tired for that\" . Encouraged to get it during pregnancy.     Ultrasound several weeks ago confirmed dating by LMP.    Polina Barrientos is a 22 year old female    Pt presents to clinic today for a NOB visit.  Patient's last menstrual period was 2022.. Estimated Date of Delivery: 2022 is calculated from lmp and verified with U/S     She has not had bleeding since her LMP.   She has not had nausea. Weight loss has not occurred.   This was a planned pregnancy.   FOB is " involved,  Herve     OTHER CONCERNS: none    Pt's hx of HSV is negative    ============================================  PERSONAL/SOCIAL HISTORY  Lives lives with their family.  Employment: Homemaker.  Her job involves sedentary activity .  Exercises no regular exercise program  Pt denies abuse and feels safe in her home and relationships.     REVIEW OF SYSTEMS  C: NEGATIVE for fever, chills, change in weight  I: NEGATIVE for worrisome rashes, moles or lesions  E/M: NEGATIVE for ear, mouth and throat problems  R: NEGATIVE for significant cough or SOB  CV: NEGATIVE for chest pain, palpitations or peripheral edema  GI: NEGATIVE for nausea, abdominal pain, heartburn, or change in bowel habits  : NEGATIVE for frequency, dysuria, or hematuria  PSYCHIATRIC:  NEGATIVE for depression, anxiety or other mental health concerns    PHYSICAL EXAM:  /59   Pulse 69   Temp 98.6  F (37  C) (Oral)   Wt 103 kg (227 lb)   LMP 01/25/2022   BMI 41.52 kg/m    BMI- Body mass index is 41.52 kg/m ., RECOMMENDED WEIGHT GAIN: < 15 lbs.  GENERAL:  Pleasant pregnant female, alert, cooperative and well groomed.  SKIN:  Warm and dry, without lesions or rashes  HEAD: Symmetrical features.  MOUTH:  Buccal mucosa pink, moist without lesions.  Teeth in good repair.    NECK:  Thyroid without enlargement and nodules.  Lymph nodes not palpable.   LUNGS:  Clear to auscultation.      HEART:  RRR without murmur.  ABDOMEN: Soft without masses , tenderness or organomegaly.  No CVA tenderness.  Uterus not palpable at size equal to dates.  No scars noted..  MUSCULOSKELETAL:  Full range of motion  EXTREMITIES:  No edema. No significant varicosities.     PELVIC EXAM:  GENITALIA: EGBUS normal. Vulva reveals no erythema or lesions.         VAGINA:  pink, normal rugae and discharge, no lesions, good tone.   CERVIX:  smooth, without discharge or CMT.                UTERUS: Midposition,  nontender 10 week size.   ADNEXA:  Without masses or  tenderness  RECTAL:  Normal appearance.  Digital exam deferred.    GC/CHLAMYDIA CULTURE OBTAINED:PATIENT DECLINED     ASSESSMENT:  Intrauterine pregnancy at 10w4d weeks gestation.     (Z34.90) Encounter for supervision of normal pregnancy  Comment:   Plan:       PLAN:  Oriented to CNM service.    Instructed on use of triage nurse line and contacting the on call CNM after hours for an urgent need such as fever, vagina bleeding, bladder or vaginal infection, rupture of membranes,  or term labor.      Discussed the indications, uses for and false positives for quad screen  and fetal survey ultrasound at 18-20 weeks gestation. Will inform us at the next visit if she wished to avail herself of these screens.    Instructed on best evidence for: weight gain for her weight and height for pregnancy; healthy diet; exercise and activity during pregnancy; and maintenance of a generally healthy lifestyle.     Discussed the harms, benefits, side effects and alternative therapies for current prescribed and OTC medications.  Erin Avalos, CNM, APRN CNM

## 2022-04-09 ENCOUNTER — TELEPHONE (OUTPATIENT)
Dept: MIDWIFE SERVICES | Facility: CLINIC | Age: 22
End: 2022-04-09
Payer: COMMERCIAL

## 2022-04-09 DIAGNOSIS — N30.00 ACUTE CYSTITIS WITHOUT HEMATURIA: Primary | ICD-10-CM

## 2022-04-09 LAB
BACTERIA UR CULT: ABNORMAL
T PALLIDUM AB SER QL: NONREACTIVE

## 2022-04-09 RX ORDER — CEPHALEXIN 500 MG/1
500 CAPSULE ORAL 3 TIMES DAILY
Qty: 30 CAPSULE | Refills: 0 | Status: SHIPPED | OUTPATIENT
Start: 2022-04-09 | End: 2022-04-09

## 2022-04-09 RX ORDER — CEPHALEXIN 500 MG/1
500 CAPSULE ORAL 2 TIMES DAILY
Qty: 14 CAPSULE | Refills: 0 | Status: SHIPPED | OUTPATIENT
Start: 2022-04-09 | End: 2022-04-16

## 2022-04-09 NOTE — TELEPHONE ENCOUNTER
Called [facility] and left message with Herve to return call. UA result from yesterday shows bladder infection. Rx sent to [facility] for keflex for treatment of UTI. Per Herve, cintia will return to my call so I can give her this information.     Erin Avalos CNM, APRLOUISE Fish returned my call. She stated that her urine did have an unusual odor lately. Discussed the need for treatment and she agrees. Will  abx this afternoon.     Erin Avalos CNM, OMERO BROWN

## 2022-04-11 LAB
HBV SURFACE AG SERPL QL IA: NONREACTIVE
HCV AB SERPL QL IA: NONREACTIVE
HIV 1+2 AB+HIV1 P24 AG SERPL QL IA: NONREACTIVE
RUBV IGG SERPL QL IA: 1.43 INDEX
RUBV IGG SERPL QL IA: POSITIVE

## 2022-04-13 ENCOUNTER — TELEPHONE (OUTPATIENT)
Dept: BEHAVIORAL HEALTH | Facility: CLINIC | Age: 22
End: 2022-04-13
Payer: COMMERCIAL

## 2022-04-13 NOTE — TELEPHONE ENCOUNTER
Outreach attempt to offer [facility] services, no answer, left VM. Will make another attempt this week.

## 2022-04-15 ENCOUNTER — TELEPHONE (OUTPATIENT)
Dept: BEHAVIORAL HEALTH | Facility: CLINIC | Age: 22
End: 2022-04-15
Payer: COMMERCIAL

## 2022-05-05 ENCOUNTER — TRANSFERRED RECORDS (OUTPATIENT)
Dept: HEALTH INFORMATION MANAGEMENT | Facility: CLINIC | Age: 22
End: 2022-05-05

## 2022-06-03 ENCOUNTER — TELEPHONE (OUTPATIENT)
Dept: MIDWIFE SERVICES | Facility: CLINIC | Age: 22
End: 2022-06-03
Payer: COMMERCIAL

## 2022-06-03 DIAGNOSIS — Z34.82 ENCOUNTER FOR SUPERVISION OF OTHER NORMAL PREGNANCY IN SECOND TRIMESTER: Primary | ICD-10-CM

## 2022-06-03 NOTE — TELEPHONE ENCOUNTER
Ju[facility] calling today wanting to schedule appointment and US. Missed last appointment so 20 week US was not ordered. Order cued up for provider review. RN encouraged reception to schedule the patient for her next prenatal instead of waiting for ultrasound order.  Anjali Cota RN

## 2022-06-07 ENCOUNTER — PRENATAL OFFICE VISIT (OUTPATIENT)
Dept: MIDWIFE SERVICES | Facility: CLINIC | Age: 22
End: 2022-06-07
Payer: COMMERCIAL

## 2022-06-07 ENCOUNTER — TRANSCRIBE ORDERS (OUTPATIENT)
Dept: MATERNAL FETAL MEDICINE | Facility: CLINIC | Age: 22
End: 2022-06-07
Payer: COMMERCIAL

## 2022-06-07 VITALS — WEIGHT: 226.3 LBS | BODY MASS INDEX: 41.39 KG/M2 | SYSTOLIC BLOOD PRESSURE: 112 MMHG | DIASTOLIC BLOOD PRESSURE: 68 MMHG

## 2022-06-07 DIAGNOSIS — F11.20 SUBOXONE MAINTENANCE TREATMENT COMPLICATING PREGNANCY, ANTEPARTUM (H): ICD-10-CM

## 2022-06-07 DIAGNOSIS — O26.90 PREGNANCY RELATED CONDITION, ANTEPARTUM: Primary | ICD-10-CM

## 2022-06-07 DIAGNOSIS — O99.320 SUBOXONE MAINTENANCE TREATMENT COMPLICATING PREGNANCY, ANTEPARTUM (H): ICD-10-CM

## 2022-06-07 DIAGNOSIS — E66.01 MORBID OBESITY (H): ICD-10-CM

## 2022-06-07 DIAGNOSIS — Z34.82 ENCOUNTER FOR SUPERVISION OF OTHER NORMAL PREGNANCY IN SECOND TRIMESTER: Primary | ICD-10-CM

## 2022-06-07 PROCEDURE — 99212 OFFICE O/P EST SF 10 MIN: CPT | Performed by: ADVANCED PRACTICE MIDWIFE

## 2022-06-07 ASSESSMENT — PATIENT HEALTH QUESTIONNAIRE - PHQ9: SUM OF ALL RESPONSES TO PHQ QUESTIONS 1-9: 5

## 2022-06-07 NOTE — PROGRESS NOTES
19w0d   Feeling well overall.  Felt some cramps after taking a long walk yesterday to the park with her daughter, resolved with rest.  M referral made for fetal survey secondary to hx of suboxone use and pre gravid BMI > 40.  GCT and hgb next visit, also plan to get UA/UC for SOFY at that time.  OMERO GuardadoM

## 2022-06-12 ENCOUNTER — HEALTH MAINTENANCE LETTER (OUTPATIENT)
Age: 22
End: 2022-06-12

## 2022-07-01 ENCOUNTER — PRE VISIT (OUTPATIENT)
Dept: MATERNAL FETAL MEDICINE | Facility: CLINIC | Age: 22
End: 2022-07-01

## 2022-07-06 ENCOUNTER — HOSPITAL ENCOUNTER (OUTPATIENT)
Dept: ULTRASOUND IMAGING | Facility: CLINIC | Age: 22
Discharge: HOME | End: 2022-07-06
Attending: ADVANCED PRACTICE MIDWIFE
Payer: COMMERCIAL

## 2022-07-06 ENCOUNTER — OFFICE VISIT (OUTPATIENT)
Dept: MATERNAL FETAL MEDICINE | Facility: CLINIC | Age: 22
End: 2022-07-06
Attending: ADVANCED PRACTICE MIDWIFE
Payer: COMMERCIAL

## 2022-07-06 DIAGNOSIS — O26.90 PREGNANCY RELATED CONDITION, ANTEPARTUM: ICD-10-CM

## 2022-07-06 DIAGNOSIS — O99.210 OBESITY AFFECTING PREGNANCY, ANTEPARTUM: Primary | ICD-10-CM

## 2022-07-06 PROBLEM — O09.90 SUPERVISION OF HIGH RISK PREGNANCY, ANTEPARTUM: Status: ACTIVE | Noted: 2022-07-06

## 2022-07-06 PROCEDURE — 76811 OB US DETAILED SNGL FETUS: CPT

## 2022-07-06 PROCEDURE — 76811 OB US DETAILED SNGL FETUS: CPT | Mod: 26 | Performed by: OBSTETRICS & GYNECOLOGY

## 2022-07-06 NOTE — PROGRESS NOTES
Please see full imaging report from [employer] program under imaging tab.      Flavio Lambert MD  Maternal Fetal Medicine

## 2022-07-18 DIAGNOSIS — Z34.82 ENCOUNTER FOR SUPERVISION OF OTHER NORMAL PREGNANCY IN SECOND TRIMESTER: Primary | ICD-10-CM

## 2022-07-27 ENCOUNTER — TELEPHONE (OUTPATIENT)
Dept: MIDWIFE SERVICES | Facility: CLINIC | Age: 22
End: 2022-07-27

## 2022-07-27 NOTE — TELEPHONE ENCOUNTER
----- Message from OMERO Scott CNM sent at 7/26/2022  3:59 PM CDT -----  Hello,   Can you call this patient and help her reschedule or appointment. She needs to do her sugar testing with her appointment.   Thanks! Juhi

## 2022-07-27 NOTE — TELEPHONE ENCOUNTER
Called pt 7/27 mobile phone was not working. Called home phone and mother answered. Asked her to have patient give us a call back later today.

## 2022-08-10 ENCOUNTER — HOSPITAL ENCOUNTER (OUTPATIENT)
Dept: ULTRASOUND IMAGING | Facility: CLINIC | Age: 22
Discharge: HOME | End: 2022-08-10
Attending: OBSTETRICS & GYNECOLOGY
Payer: COMMERCIAL

## 2022-08-10 ENCOUNTER — OFFICE VISIT (OUTPATIENT)
Dept: MATERNAL FETAL MEDICINE | Facility: CLINIC | Age: 22
End: 2022-08-10
Attending: OBSTETRICS & GYNECOLOGY
Payer: COMMERCIAL

## 2022-08-10 DIAGNOSIS — O99.210 OBESITY AFFECTING PREGNANCY, ANTEPARTUM: Primary | ICD-10-CM

## 2022-08-10 DIAGNOSIS — O99.210 OBESITY AFFECTING PREGNANCY, ANTEPARTUM: ICD-10-CM

## 2022-08-10 DIAGNOSIS — O35.9XX0 SUSPECTED FETAL ANOMALY, ANTEPARTUM, SINGLE OR UNSPECIFIED FETUS: ICD-10-CM

## 2022-08-10 PROCEDURE — 76816 OB US FOLLOW-UP PER FETUS: CPT | Mod: 26 | Performed by: OBSTETRICS & GYNECOLOGY

## 2022-08-10 PROCEDURE — 76816 OB US FOLLOW-UP PER FETUS: CPT

## 2022-08-30 ENCOUNTER — TELEPHONE (OUTPATIENT)
Dept: MIDWIFE SERVICES | Facility: CLINIC | Age: 22
End: 2022-08-30

## 2022-08-30 NOTE — TELEPHONE ENCOUNTER
----- Message from OMERO Scott CNM sent at 8/30/2022 10:51 AM CDT -----  Hello,   Can you reach out to her to get her scheduled for an appointment earlier than 9/20. She is due for her GCT and a urine culture and that should be done sooner if possible.    Thank you! Juhi

## 2022-09-21 ENCOUNTER — HOSPITAL ENCOUNTER (OUTPATIENT)
Dept: ULTRASOUND IMAGING | Facility: CLINIC | Age: 22
Discharge: HOME | End: 2022-09-21
Attending: OBSTETRICS & GYNECOLOGY
Payer: COMMERCIAL

## 2022-09-21 ENCOUNTER — OFFICE VISIT (OUTPATIENT)
Dept: MATERNAL FETAL MEDICINE | Facility: CLINIC | Age: 22
End: 2022-09-21
Attending: OBSTETRICS & GYNECOLOGY
Payer: COMMERCIAL

## 2022-09-21 DIAGNOSIS — O35.EXX0 ENCOUNTER FOR REPEAT ULTRASOUND OF FETAL PYELECTASIS IN SINGLETON PREGNANCY, ANTEPARTUM: ICD-10-CM

## 2022-09-21 DIAGNOSIS — O99.213 OBESITY AFFECTING PREGNANCY IN THIRD TRIMESTER: Primary | ICD-10-CM

## 2022-09-21 DIAGNOSIS — O40.3XX0 POLYHYDRAMNIOS IN THIRD TRIMESTER COMPLICATION, SINGLE OR UNSPECIFIED FETUS: ICD-10-CM

## 2022-09-21 DIAGNOSIS — O35.9XX0 SUSPECTED FETAL ANOMALY, ANTEPARTUM, SINGLE OR UNSPECIFIED FETUS: ICD-10-CM

## 2022-09-21 DIAGNOSIS — O35.EXX0 ENCOUNTER FOR REPEAT ULTRASOUND OF FETAL PYELECTASIS, ANTEPARTUM, SINGLE OR UNSPECIFIED FETUS: ICD-10-CM

## 2022-09-21 PROCEDURE — 76819 FETAL BIOPHYS PROFIL W/O NST: CPT

## 2022-09-21 PROCEDURE — 76816 OB US FOLLOW-UP PER FETUS: CPT

## 2022-09-21 PROCEDURE — 76819 FETAL BIOPHYS PROFIL W/O NST: CPT | Mod: 26 | Performed by: STUDENT IN AN ORGANIZED HEALTH CARE EDUCATION/TRAINING PROGRAM

## 2022-09-21 PROCEDURE — 76816 OB US FOLLOW-UP PER FETUS: CPT | Mod: 26 | Performed by: STUDENT IN AN ORGANIZED HEALTH CARE EDUCATION/TRAINING PROGRAM

## 2022-09-21 NOTE — PROGRESS NOTES
Please see the full imaging report from the [employer] program under the imaging tab.    Susanna Guerrero MD  Maternal Fetal Medicine

## 2022-09-22 ENCOUNTER — TELEPHONE (OUTPATIENT)
Dept: MIDWIFE SERVICES | Facility: CLINIC | Age: 22
End: 2022-09-22

## 2022-09-22 NOTE — TELEPHONE ENCOUNTER
----- Message from OMERO Clayton CNM sent at 9/21/2022  3:59 PM CDT -----  Can you call her and set an appt with one of the CNMs?    She's been seeing MFM but has not seen the midwives for a long time, so ASAP if possible.    Thank you    Jacqui

## 2022-09-23 ENCOUNTER — TELEPHONE (OUTPATIENT)
Dept: UROLOGY | Facility: CLINIC | Age: 22
End: 2022-09-23

## 2022-09-28 ENCOUNTER — OFFICE VISIT (OUTPATIENT)
Dept: MATERNAL FETAL MEDICINE | Facility: CLINIC | Age: 22
End: 2022-09-28
Attending: OBSTETRICS & GYNECOLOGY
Payer: COMMERCIAL

## 2022-09-28 ENCOUNTER — HOSPITAL ENCOUNTER (OUTPATIENT)
Dept: ULTRASOUND IMAGING | Facility: CLINIC | Age: 22
Discharge: HOME | End: 2022-09-28
Attending: OBSTETRICS & GYNECOLOGY
Payer: COMMERCIAL

## 2022-09-28 DIAGNOSIS — O99.210 OBESITY AFFECTING PREGNANCY, ANTEPARTUM: ICD-10-CM

## 2022-09-28 DIAGNOSIS — O99.213 OBESITY AFFECTING PREGNANCY IN THIRD TRIMESTER: Primary | ICD-10-CM

## 2022-09-28 PROCEDURE — 76819 FETAL BIOPHYS PROFIL W/O NST: CPT | Mod: 26 | Performed by: OBSTETRICS & GYNECOLOGY

## 2022-09-28 PROCEDURE — 76819 FETAL BIOPHYS PROFIL W/O NST: CPT

## 2022-10-04 ENCOUNTER — VIRTUAL VISIT (OUTPATIENT)
Dept: UROLOGY | Facility: CLINIC | Age: 22
End: 2022-10-04
Attending: STUDENT IN AN ORGANIZED HEALTH CARE EDUCATION/TRAINING PROGRAM
Payer: COMMERCIAL

## 2022-10-04 DIAGNOSIS — O35.EXX0 PYELECTASIS OF FETUS ON PRENATAL ULTRASOUND: Primary | ICD-10-CM

## 2022-10-04 PROCEDURE — 99202 OFFICE O/P NEW SF 15 MIN: CPT | Mod: TEL | Performed by: NURSE PRACTITIONER

## 2022-10-04 RX ORDER — BUPRENORPHINE HYDROCHLORIDE, NALOXONE HYDROCHLORIDE 12; 3 MG/1; MG/1
FILM, SOLUBLE BUCCAL; SUBLINGUAL
Status: ON HOLD | COMMUNITY
Start: 2022-09-27 | End: 2022-11-07

## 2022-10-04 NOTE — PROGRESS NOTES
Susanna Guerrero    RE:  Polina Barrientos  :  2000  [place] MRN:  [id number]  Date of visit:  2022    Dear Dr. Guerrero:    I had the pleasure of speaking with your patient, Polina, today through the [facility] in urology consultation for the question of prenatally detected bilateral UTD A2. Please see below the details of this visit and my impression and plans discussed with the family.        CC:  Prenatal consult    HPI:  Polina Barrientos is a 22 year old woman whom I was asked to see in consultation for the above. This is Polina's second pregnancy. The sex of the fetus is male. At the 34 week ultrasound bilateral renal pelvis dilation (UTD A2-3) with bilateral dilated ureters and large bladder persisted. So far the pregnancy has been going OK, Polina has been having a lot of pressure. Polina is planning to deliver at [facility]. There is no family history of genitourinary disorders in childhood.    PMH:  Reviewed, no significant medical history     PSH:   Reviewed, no surgical history     Meds, allergies, family history, social history reviewed per intake form and confirmed in our EMR.    ROS:  Negative on a 12-point scale. All other pertinent positives mentioned in the HPI.    PE: Telephone encounter  Last menstrual period 2022, not currently breastfeeding.  There is no height or weight on file to calculate BMI.      Impression:  Prenatally detected fetal (male) bilateral renal pelvis dilation with bilateral dilated ureters and subjectively enlarged bladder.    Plan:    We discussed the likely prenatal causes for this, including prenatal obstructive issues that have already resolved versus those that may need surgical help with resolution in childhood, as well as the possibility of vesicoureteral reflux. We discussed the risks for urinary tract infection, and the pros and cons of starting the baby on daily,  low-dose Amoxicillin, dosed at 10 mg/kg/d, which in this case we will likely do. We also made our plans for follow-up after the baby is born with renal/bladder ultrasound and VCUG in ~2 weeks. I addressed all questions and encouraged a phone call from their MFM if there are any future concerns during the pregnancy.    Thank you very much for allowing me the opportunity to participate in this nice family's care with you.    I spent a total of 25 minutes on the date of encounter doing chart review, history, documentation, and further activities as noted above.    Phone call duration: 10 minutes (15:00-15:10)    Sincerely,  OMERO Jalloh, CNP  Pediatric Urology  [facility]

## 2022-10-04 NOTE — LETTER
10/4/2022      RE: Polina Barrientos  [address]  [facility] [address]     Dear Colleague,    Thank you for the opportunity to participate in the care of your patient, Polina Barrientos, at the [facility] at [facility]. Please see a copy of my visit note below.    Susanna Guerrero    RE:  Polina Barrientos  :  2000  [place] MRN:  [id number]  Date of visit:  2022    Dear Dr. Guerrero:    I had the pleasure of speaking with your patient, Polina, today through the [facility] in urology consultation for the question of prenatally detected bilateral UTD A2. Please see below the details of this visit and my impression and plans discussed with the family.        CC:  Prenatal consult    HPI:  Polina Barrientos is a 22 year old woman whom I was asked to see in consultation for the above. This is Polina's second pregnancy. The sex of the fetus is male. At the 34 week ultrasound bilateral renal pelvis dilation (UTD A2-3) with bilateral dilated ureters and large bladder persisted. So far the pregnancy has been going OK, Polina has been having a lot of pressure. Polina is planning to deliver at [facility]. There is no family history of genitourinary disorders in childhood.    PMH:  Reviewed, no significant medical history     PSH:   Reviewed, no surgical history     Meds, allergies, family history, social history reviewed per intake form and confirmed in our EMR.    ROS:  Negative on a 12-point scale. All other pertinent positives mentioned in the HPI.    PE: Telephone encounter  Last menstrual period 2022, not currently breastfeeding.  There is no height or weight on file to calculate BMI.      Impression:  Prenatally detected fetal (male) bilateral renal pelvis dilation with bilateral dilated ureters and  subjectively enlarged bladder.    Plan:    We discussed the likely prenatal causes for this, including prenatal obstructive issues that have already resolved versus those that may need surgical help with resolution in childhood, as well as the possibility of vesicoureteral reflux. We discussed the risks for urinary tract infection, and the pros and cons of starting the baby on daily, low-dose Amoxicillin, dosed at 10 mg/kg/d, which in this case we will likely do. We also made our plans for follow-up after the baby is born with renal/bladder ultrasound and VCUG in ~2 weeks. I addressed all questions and encouraged a phone call from their MFM if there are any future concerns during the pregnancy.    Thank you very much for allowing me the opportunity to participate in this nice family's care with you.    I spent a total of 25 minutes on the date of encounter doing chart review, history, documentation, and further activities as noted above.    Phone call duration: 10 minutes (15:00-15:10)    Sincerely,  OMERO Jalloh, CNP  Pediatric Urology  [facility]

## 2022-10-04 NOTE — NURSING NOTE
Polina Barrientos is a 22 year old female who is being evaluated via a billable telephone visit.      Polina Barrientos complains of    Chief Complaint   Patient presents with     Consult     Consult For Bilateral UTD A2       Patient is located in [place]? Yes     I have reviewed and updated the patient's medication list, allergies and preferred pharmacy.    Temitope Jon, EMT

## 2022-10-04 NOTE — PATIENT INSTRUCTIONS
[facility]   Department of Pediatric Urology    Dr. Joey Michael, Pediatric Urologist  Matt Mathur, ALISHA Mcarthur    [facility] [place]  Nurse Coordinator: Joselin Vargas -[phone]    [facility]  Nurse Coordinator: [phone]    [facility] [place]  Nurse Coordinator: Joselin Vargas -[phone]      [place]  Nurse Coordinator: DENEEN La, [phone]      Once your baby is born, please do the following:  - Have your baby s doctor start your baby on a prophylactic  antibiotic. We recommend Amoxicillin 10/mg/kg/day. Your  baby s doctor will write you a prescription before you leave the  hospital. This medication is given once a day at bedtime and is  used to prevent urinary tract infections. Your baby should stay on  this medication until we tell you to stop giving it.    -After you have gone home, please call the Nurse Coordinator at your preferred  location and give her your baby s name and date of birth. She will  help coordinate the tests that need to be done about 2 weeks  after birth.    Your baby s test may include:  -Renal Bladder Ultrasound  (all locations)  - Voiding Cystourethrogram (VCUG)  ([facility])  -Mag 3 Lasix Renogram  ([facility])

## 2022-10-05 ENCOUNTER — HOSPITAL ENCOUNTER (OUTPATIENT)
Dept: ULTRASOUND IMAGING | Facility: CLINIC | Age: 22
Discharge: HOME | End: 2022-10-05
Attending: OBSTETRICS & GYNECOLOGY
Payer: COMMERCIAL

## 2022-10-05 ENCOUNTER — OFFICE VISIT (OUTPATIENT)
Dept: MATERNAL FETAL MEDICINE | Facility: CLINIC | Age: 22
End: 2022-10-05
Attending: OBSTETRICS & GYNECOLOGY
Payer: COMMERCIAL

## 2022-10-05 DIAGNOSIS — O99.210 OBESITY AFFECTING PREGNANCY, ANTEPARTUM: ICD-10-CM

## 2022-10-05 DIAGNOSIS — O99.213 OBESITY AFFECTING PREGNANCY IN THIRD TRIMESTER: Primary | ICD-10-CM

## 2022-10-05 PROCEDURE — 76819 FETAL BIOPHYS PROFIL W/O NST: CPT

## 2022-10-05 PROCEDURE — 76819 FETAL BIOPHYS PROFIL W/O NST: CPT | Mod: 26 | Performed by: OBSTETRICS & GYNECOLOGY

## 2022-10-05 NOTE — PROGRESS NOTES
"Please see \"Imaging\" tab under \"Chart Review\" for details of today's US at the [facility].    Matias Corcoran MD  Maternal-Fetal Medicine      "

## 2022-10-06 ENCOUNTER — TELEPHONE (OUTPATIENT)
Dept: MIDWIFE SERVICES | Facility: CLINIC | Age: 22
End: 2022-10-06

## 2022-10-06 DIAGNOSIS — Z34.83 ENCOUNTER FOR SUPERVISION OF OTHER NORMAL PREGNANCY IN THIRD TRIMESTER: Primary | ICD-10-CM

## 2022-10-06 NOTE — TELEPHONE ENCOUNTER
M nurse called our triage to state that this patient was now receiving care at the [facility] for the remainder of her pregnancy.

## 2022-10-12 ENCOUNTER — OFFICE VISIT (OUTPATIENT)
Dept: MATERNAL FETAL MEDICINE | Facility: CLINIC | Age: 22
End: 2022-10-12
Attending: STUDENT IN AN ORGANIZED HEALTH CARE EDUCATION/TRAINING PROGRAM
Payer: COMMERCIAL

## 2022-10-12 ENCOUNTER — HOSPITAL ENCOUNTER (OUTPATIENT)
Dept: ULTRASOUND IMAGING | Facility: CLINIC | Age: 22
Discharge: HOME | End: 2022-10-12
Attending: STUDENT IN AN ORGANIZED HEALTH CARE EDUCATION/TRAINING PROGRAM
Payer: COMMERCIAL

## 2022-10-12 DIAGNOSIS — O99.213 OBESITY AFFECTING PREGNANCY IN THIRD TRIMESTER: ICD-10-CM

## 2022-10-12 DIAGNOSIS — O99.210 OBESITY AFFECTING PREGNANCY, ANTEPARTUM: Primary | ICD-10-CM

## 2022-10-12 PROCEDURE — 76816 OB US FOLLOW-UP PER FETUS: CPT | Mod: 26 | Performed by: OBSTETRICS & GYNECOLOGY

## 2022-10-12 PROCEDURE — 76816 OB US FOLLOW-UP PER FETUS: CPT

## 2022-10-12 PROCEDURE — 76818 FETAL BIOPHYS PROFILE W/NST: CPT | Mod: 26 | Performed by: OBSTETRICS & GYNECOLOGY

## 2022-10-13 NOTE — PROGRESS NOTES
"Please see \"Imaging\" tab under \"Chart Review\" for details of today's US at the [facility].    Matias Corcoran MD  Maternal-Fetal Medicine      "

## 2022-10-19 ENCOUNTER — HOSPITAL ENCOUNTER (OUTPATIENT)
Dept: ULTRASOUND IMAGING | Facility: CLINIC | Age: 22
Discharge: HOME | End: 2022-10-19
Attending: STUDENT IN AN ORGANIZED HEALTH CARE EDUCATION/TRAINING PROGRAM
Payer: COMMERCIAL

## 2022-10-19 ENCOUNTER — OFFICE VISIT (OUTPATIENT)
Dept: MATERNAL FETAL MEDICINE | Facility: CLINIC | Age: 22
End: 2022-10-19
Attending: STUDENT IN AN ORGANIZED HEALTH CARE EDUCATION/TRAINING PROGRAM
Payer: COMMERCIAL

## 2022-10-19 DIAGNOSIS — O99.213 OBESITY AFFECTING PREGNANCY IN THIRD TRIMESTER: Primary | ICD-10-CM

## 2022-10-19 DIAGNOSIS — O99.213 OBESITY AFFECTING PREGNANCY IN THIRD TRIMESTER: ICD-10-CM

## 2022-10-19 PROCEDURE — 76819 FETAL BIOPHYS PROFIL W/O NST: CPT | Mod: 26 | Performed by: OBSTETRICS & GYNECOLOGY

## 2022-10-19 PROCEDURE — 76819 FETAL BIOPHYS PROFIL W/O NST: CPT

## 2022-10-19 NOTE — PROGRESS NOTES
"Please see \"Imaging\" tab under \"Chart Review\" for details of today's US at the [facility].    Matias Corcoran MD  Maternal-Fetal Medicine      "

## 2022-10-23 ENCOUNTER — HEALTH MAINTENANCE LETTER (OUTPATIENT)
Age: 22
End: 2022-10-23

## 2022-10-26 ENCOUNTER — OFFICE VISIT (OUTPATIENT)
Dept: MATERNAL FETAL MEDICINE | Facility: CLINIC | Age: 22
End: 2022-10-26
Attending: STUDENT IN AN ORGANIZED HEALTH CARE EDUCATION/TRAINING PROGRAM
Payer: COMMERCIAL

## 2022-10-26 ENCOUNTER — HOSPITAL ENCOUNTER (OUTPATIENT)
Dept: ULTRASOUND IMAGING | Facility: CLINIC | Age: 22
Discharge: HOME | End: 2022-10-26
Attending: STUDENT IN AN ORGANIZED HEALTH CARE EDUCATION/TRAINING PROGRAM
Payer: COMMERCIAL

## 2022-10-26 DIAGNOSIS — O99.213 OBESITY AFFECTING PREGNANCY IN THIRD TRIMESTER: Primary | ICD-10-CM

## 2022-10-26 DIAGNOSIS — O99.213 OBESITY AFFECTING PREGNANCY IN THIRD TRIMESTER: ICD-10-CM

## 2022-10-26 PROCEDURE — 76819 FETAL BIOPHYS PROFIL W/O NST: CPT

## 2022-10-26 PROCEDURE — 76819 FETAL BIOPHYS PROFIL W/O NST: CPT | Mod: 26 | Performed by: OBSTETRICS & GYNECOLOGY

## 2022-11-02 ENCOUNTER — OFFICE VISIT (OUTPATIENT)
Dept: MATERNAL FETAL MEDICINE | Facility: CLINIC | Age: 22
End: 2022-11-02
Attending: STUDENT IN AN ORGANIZED HEALTH CARE EDUCATION/TRAINING PROGRAM
Payer: COMMERCIAL

## 2022-11-02 ENCOUNTER — HOSPITAL ENCOUNTER (OUTPATIENT)
Dept: ULTRASOUND IMAGING | Facility: CLINIC | Age: 22
Discharge: HOME | End: 2022-11-02
Attending: STUDENT IN AN ORGANIZED HEALTH CARE EDUCATION/TRAINING PROGRAM
Payer: COMMERCIAL

## 2022-11-02 DIAGNOSIS — O99.213 OBESITY AFFECTING PREGNANCY IN THIRD TRIMESTER: ICD-10-CM

## 2022-11-02 DIAGNOSIS — O99.213 OBESITY AFFECTING PREGNANCY IN THIRD TRIMESTER: Primary | ICD-10-CM

## 2022-11-02 DIAGNOSIS — O48.0 POST-TERM PREGNANCY, 40-42 WEEKS OF GESTATION: ICD-10-CM

## 2022-11-02 PROCEDURE — 76819 FETAL BIOPHYS PROFIL W/O NST: CPT

## 2022-11-02 PROCEDURE — 76819 FETAL BIOPHYS PROFIL W/O NST: CPT | Mod: 26 | Performed by: OBSTETRICS & GYNECOLOGY

## 2022-11-02 NOTE — PROGRESS NOTES
"Please see \"Imaging\" tab under \"Chart Review\" for details of today's US at the [facility].    Matias Corcoran MD  Maternal-Fetal Medicine      "

## 2022-11-03 PROBLEM — D62 ANEMIA DUE TO BLOOD LOSS, ACUTE: Status: RESOLVED | Noted: 2020-11-20 | Resolved: 2022-11-03

## 2022-11-03 PROBLEM — Z23 NEED FOR TDAP VACCINATION: Status: RESOLVED | Noted: 2022-03-31 | Resolved: 2022-11-03

## 2022-11-04 ENCOUNTER — EXTERNAL ORDER RESULTS (OUTPATIENT)
Dept: LAB | Facility: CLINIC | Age: 22
End: 2022-11-04

## 2022-11-04 ENCOUNTER — HOSPITAL ENCOUNTER (INPATIENT)
Facility: CLINIC | Age: 22
LOS: 4 days | Discharge: HOME | End: 2022-11-08
Attending: OBSTETRICS & GYNECOLOGY | Admitting: REGISTERED NURSE
Payer: COMMERCIAL

## 2022-11-04 DIAGNOSIS — Z98.891 S/P C-SECTION: Primary | ICD-10-CM

## 2022-11-04 LAB
ABO/RH(D): NORMAL
AMPHETAMINES UR QL SCN: NORMAL
ANTIBODY SCREEN: NEGATIVE
CANNABINOIDS UR QL SCN: NORMAL
COCAINE UR QL: NORMAL
ERYTHROCYTE [DISTWIDTH] IN BLOOD BY AUTOMATED COUNT: 15.9 % (ref 10–15)
HCT VFR BLD AUTO: 34.4 % (ref 35–47)
HGB BLD-MCNC: 10.7 G/DL (ref 11.7–15.7)
MCH RBC QN AUTO: 24.4 PG (ref 26.5–33)
MCHC RBC AUTO-ENTMCNC: 31.1 G/DL (ref 31.5–36.5)
MCV RBC AUTO: 79 FL (ref 78–100)
OPIATES UR QL SCN: NORMAL
PCP UR QL SCN: NORMAL
PLATELET # BLD AUTO: 248 10E3/UL (ref 150–450)
RBC # BLD AUTO: 4.38 10E6/UL (ref 3.8–5.2)
SPECIMEN EXPIRATION DATE: NORMAL
WBC # BLD AUTO: 11.9 10E3/UL (ref 4–11)

## 2022-11-04 PROCEDURE — 85014 HEMATOCRIT: CPT | Performed by: REGISTERED NURSE

## 2022-11-04 PROCEDURE — 250N000013 HC RX MED GY IP 250 OP 250 PS 637: Performed by: REGISTERED NURSE

## 2022-11-04 PROCEDURE — 120N000002 HC R&B MED SURG/OB UMMC

## 2022-11-04 PROCEDURE — 3E0P7VZ INTRODUCTION OF HORMONE INTO FEMALE REPRODUCTIVE, VIA NATURAL OR ARTIFICIAL OPENING: ICD-10-PCS | Performed by: REGISTERED NURSE

## 2022-11-04 PROCEDURE — 86850 RBC ANTIBODY SCREEN: CPT | Performed by: REGISTERED NURSE

## 2022-11-04 PROCEDURE — 80307 DRUG TEST PRSMV CHEM ANLYZR: CPT | Performed by: REGISTERED NURSE

## 2022-11-04 PROCEDURE — 86780 TREPONEMA PALLIDUM: CPT | Performed by: REGISTERED NURSE

## 2022-11-04 PROCEDURE — U0005 INFEC AGEN DETEC AMPLI PROBE: HCPCS | Performed by: REGISTERED NURSE

## 2022-11-04 PROCEDURE — 86901 BLOOD TYPING SEROLOGIC RH(D): CPT | Performed by: REGISTERED NURSE

## 2022-11-04 RX ORDER — IBUPROFEN 200 MG
600 TABLET ORAL EVERY 4 HOURS PRN
Status: ON HOLD | COMMUNITY
End: 2022-11-07

## 2022-11-04 RX ORDER — NALOXONE HYDROCHLORIDE 0.4 MG/ML
0.4 INJECTION, SOLUTION INTRAMUSCULAR; INTRAVENOUS; SUBCUTANEOUS
Status: DISCONTINUED | OUTPATIENT
Start: 2022-11-04 | End: 2022-11-06

## 2022-11-04 RX ORDER — METOCLOPRAMIDE 10 MG/1
10 TABLET ORAL EVERY 6 HOURS PRN
Status: DISCONTINUED | OUTPATIENT
Start: 2022-11-04 | End: 2022-11-06

## 2022-11-04 RX ORDER — CARBOPROST TROMETHAMINE 250 UG/ML
250 INJECTION, SOLUTION INTRAMUSCULAR
Status: DISCONTINUED | OUTPATIENT
Start: 2022-11-04 | End: 2022-11-06

## 2022-11-04 RX ORDER — FLUOXETINE 40 MG/1
40 CAPSULE ORAL DAILY
COMMUNITY

## 2022-11-04 RX ORDER — OXYTOCIN/0.9 % SODIUM CHLORIDE 30/500 ML
100-340 PLASTIC BAG, INJECTION (ML) INTRAVENOUS CONTINUOUS PRN
Status: DISCONTINUED | OUTPATIENT
Start: 2022-11-04 | End: 2022-11-06

## 2022-11-04 RX ORDER — BUPRENORPHINE AND NALOXONE 8; 2 MG/1; MG/1
1 FILM, SOLUBLE BUCCAL; SUBLINGUAL DAILY
Status: DISCONTINUED | OUTPATIENT
Start: 2022-11-05 | End: 2022-11-08 | Stop reason: HOSPADM

## 2022-11-04 RX ORDER — ONDANSETRON 2 MG/ML
4 INJECTION INTRAMUSCULAR; INTRAVENOUS EVERY 6 HOURS PRN
Status: DISCONTINUED | OUTPATIENT
Start: 2022-11-04 | End: 2022-11-06

## 2022-11-04 RX ORDER — BUPRENORPHINE AND NALOXONE 4; 1 MG/1; MG/1
1 FILM, SOLUBLE BUCCAL; SUBLINGUAL DAILY
Status: DISCONTINUED | OUTPATIENT
Start: 2022-11-05 | End: 2022-11-08 | Stop reason: HOSPADM

## 2022-11-04 RX ORDER — CITRIC ACID/SODIUM CITRATE 334-500MG
30 SOLUTION, ORAL ORAL
Status: DISCONTINUED | OUTPATIENT
Start: 2022-11-04 | End: 2022-11-06

## 2022-11-04 RX ORDER — OXYTOCIN 10 [USP'U]/ML
10 INJECTION, SOLUTION INTRAMUSCULAR; INTRAVENOUS
Status: DISCONTINUED | OUTPATIENT
Start: 2022-11-04 | End: 2022-11-06

## 2022-11-04 RX ORDER — PROCHLORPERAZINE MALEATE 10 MG
10 TABLET ORAL EVERY 6 HOURS PRN
Status: DISCONTINUED | OUTPATIENT
Start: 2022-11-04 | End: 2022-11-06

## 2022-11-04 RX ORDER — KETOROLAC TROMETHAMINE 30 MG/ML
30 INJECTION, SOLUTION INTRAMUSCULAR; INTRAVENOUS
Status: DISCONTINUED | OUTPATIENT
Start: 2022-11-04 | End: 2022-11-06

## 2022-11-04 RX ORDER — OXYTOCIN/0.9 % SODIUM CHLORIDE 30/500 ML
340 PLASTIC BAG, INJECTION (ML) INTRAVENOUS CONTINUOUS PRN
Status: DISCONTINUED | OUTPATIENT
Start: 2022-11-04 | End: 2022-11-06

## 2022-11-04 RX ORDER — NALOXONE HYDROCHLORIDE 0.4 MG/ML
0.2 INJECTION, SOLUTION INTRAMUSCULAR; INTRAVENOUS; SUBCUTANEOUS
Status: DISCONTINUED | OUTPATIENT
Start: 2022-11-04 | End: 2022-11-06

## 2022-11-04 RX ORDER — IBUPROFEN 800 MG/1
800 TABLET, FILM COATED ORAL
Status: DISCONTINUED | OUTPATIENT
Start: 2022-11-04 | End: 2022-11-06

## 2022-11-04 RX ORDER — METOCLOPRAMIDE HYDROCHLORIDE 5 MG/ML
10 INJECTION INTRAMUSCULAR; INTRAVENOUS EVERY 6 HOURS PRN
Status: DISCONTINUED | OUTPATIENT
Start: 2022-11-04 | End: 2022-11-06

## 2022-11-04 RX ORDER — MISOPROSTOL 100 UG/1
25 TABLET ORAL EVERY 4 HOURS PRN
Status: DISCONTINUED | OUTPATIENT
Start: 2022-11-04 | End: 2022-11-06

## 2022-11-04 RX ORDER — MISOPROSTOL 200 UG/1
800 TABLET ORAL
Status: DISCONTINUED | OUTPATIENT
Start: 2022-11-04 | End: 2022-11-06

## 2022-11-04 RX ORDER — PROCHLORPERAZINE 25 MG
25 SUPPOSITORY, RECTAL RECTAL EVERY 12 HOURS PRN
Status: DISCONTINUED | OUTPATIENT
Start: 2022-11-04 | End: 2022-11-06

## 2022-11-04 RX ORDER — MISOPROSTOL 200 UG/1
400 TABLET ORAL
Status: DISCONTINUED | OUTPATIENT
Start: 2022-11-04 | End: 2022-11-06

## 2022-11-04 RX ORDER — ONDANSETRON 4 MG/1
4 TABLET, ORALLY DISINTEGRATING ORAL EVERY 6 HOURS PRN
Status: DISCONTINUED | OUTPATIENT
Start: 2022-11-04 | End: 2022-11-06

## 2022-11-04 RX ORDER — METHYLERGONOVINE MALEATE 0.2 MG/ML
200 INJECTION INTRAVENOUS
Status: DISCONTINUED | OUTPATIENT
Start: 2022-11-04 | End: 2022-11-06

## 2022-11-04 RX ORDER — TRANEXAMIC ACID 10 MG/ML
1 INJECTION, SOLUTION INTRAVENOUS EVERY 30 MIN PRN
Status: DISCONTINUED | OUTPATIENT
Start: 2022-11-04 | End: 2022-11-06

## 2022-11-04 RX ADMIN — MISOPROSTOL 25 MCG: 100 TABLET ORAL at 23:40

## 2022-11-04 ASSESSMENT — ACTIVITIES OF DAILY LIVING (ADL)
ADLS_ACUITY_SCORE: 35
ADLS_ACUITY_SCORE: 18

## 2022-11-05 ENCOUNTER — ANESTHESIA (OUTPATIENT)
Dept: OBGYN | Facility: CLINIC | Age: 22
End: 2022-11-05
Payer: COMMERCIAL

## 2022-11-05 ENCOUNTER — ANESTHESIA EVENT (OUTPATIENT)
Dept: OBGYN | Facility: CLINIC | Age: 22
End: 2022-11-05
Payer: COMMERCIAL

## 2022-11-05 LAB
FENTANYL UR QL: NORMAL
SARS-COV-2 RNA RESP QL NAA+PROBE: NEGATIVE
T PALLIDUM AB SER QL: NONREACTIVE

## 2022-11-05 PROCEDURE — 258N000003 HC RX IP 258 OP 636: Performed by: REGISTERED NURSE

## 2022-11-05 PROCEDURE — 258N000003 HC RX IP 258 OP 636: Performed by: ADVANCED PRACTICE MIDWIFE

## 2022-11-05 PROCEDURE — 10907ZC DRAINAGE OF AMNIOTIC FLUID, THERAPEUTIC FROM PRODUCTS OF CONCEPTION, VIA NATURAL OR ARTIFICIAL OPENING: ICD-10-PCS | Performed by: ADVANCED PRACTICE MIDWIFE

## 2022-11-05 PROCEDURE — 250N000009 HC RX 250

## 2022-11-05 PROCEDURE — 250N000011 HC RX IP 250 OP 636

## 2022-11-05 PROCEDURE — 120N000002 HC R&B MED SURG/OB UMMC

## 2022-11-05 PROCEDURE — 3E0R3BZ INTRODUCTION OF ANESTHETIC AGENT INTO SPINAL CANAL, PERCUTANEOUS APPROACH: ICD-10-PCS | Performed by: STUDENT IN AN ORGANIZED HEALTH CARE EDUCATION/TRAINING PROGRAM

## 2022-11-05 PROCEDURE — 250N000013 HC RX MED GY IP 250 OP 250 PS 637: Performed by: REGISTERED NURSE

## 2022-11-05 PROCEDURE — 00HU33Z INSERTION OF INFUSION DEVICE INTO SPINAL CANAL, PERCUTANEOUS APPROACH: ICD-10-PCS | Performed by: STUDENT IN AN ORGANIZED HEALTH CARE EDUCATION/TRAINING PROGRAM

## 2022-11-05 RX ORDER — OXYTOCIN/0.9 % SODIUM CHLORIDE 30/500 ML
1-24 PLASTIC BAG, INJECTION (ML) INTRAVENOUS CONTINUOUS
Status: DISCONTINUED | OUTPATIENT
Start: 2022-11-05 | End: 2022-11-06

## 2022-11-05 RX ORDER — LIDOCAINE 40 MG/G
CREAM TOPICAL
Status: DISCONTINUED | OUTPATIENT
Start: 2022-11-05 | End: 2022-11-06

## 2022-11-05 RX ORDER — MISOPROSTOL 200 UG/1
TABLET ORAL
Status: DISCONTINUED
Start: 2022-11-05 | End: 2022-11-06 | Stop reason: HOSPADM

## 2022-11-05 RX ORDER — BUPRENORPHINE AND NALOXONE 8; 2 MG/1; MG/1
1 FILM, SOLUBLE BUCCAL; SUBLINGUAL DAILY
COMMUNITY

## 2022-11-05 RX ORDER — BUPRENORPHINE AND NALOXONE 4; 1 MG/1; MG/1
1 FILM, SOLUBLE BUCCAL; SUBLINGUAL DAILY
COMMUNITY

## 2022-11-05 RX ORDER — FENTANYL CITRATE-0.9 % NACL/PF 10 MCG/ML
100 PLASTIC BAG, INJECTION (ML) INTRAVENOUS EVERY 5 MIN PRN
Status: DISCONTINUED | OUTPATIENT
Start: 2022-11-05 | End: 2022-11-06

## 2022-11-05 RX ORDER — FENTANYL CITRATE-0.9 % NACL/PF 10 MCG/ML
PLASTIC BAG, INJECTION (ML) INTRAVENOUS
Status: DISCONTINUED
Start: 2022-11-05 | End: 2022-11-06 | Stop reason: HOSPADM

## 2022-11-05 RX ORDER — SODIUM CHLORIDE, SODIUM LACTATE, POTASSIUM CHLORIDE, CALCIUM CHLORIDE 600; 310; 30; 20 MG/100ML; MG/100ML; MG/100ML; MG/100ML
INJECTION, SOLUTION INTRAVENOUS CONTINUOUS PRN
Status: DISCONTINUED | OUTPATIENT
Start: 2022-11-05 | End: 2022-11-06

## 2022-11-05 RX ORDER — OXYTOCIN 10 [USP'U]/ML
INJECTION, SOLUTION INTRAMUSCULAR; INTRAVENOUS
Status: DISCONTINUED
Start: 2022-11-05 | End: 2022-11-06 | Stop reason: HOSPADM

## 2022-11-05 RX ORDER — FENTANYL/ROPIVACAINE/NS/PF 2MCG/ML-.1
PLASTIC BAG, INJECTION (ML) EPIDURAL
Status: DISCONTINUED | OUTPATIENT
Start: 2022-11-05 | End: 2022-11-06

## 2022-11-05 RX ORDER — LIDOCAINE HYDROCHLORIDE 10 MG/ML
INJECTION, SOLUTION EPIDURAL; INFILTRATION; INTRACAUDAL; PERINEURAL
Status: DISCONTINUED
Start: 2022-11-05 | End: 2022-11-06 | Stop reason: HOSPADM

## 2022-11-05 RX ORDER — FENTANYL/ROPIVACAINE/NS/PF 2MCG/ML-.1
PLASTIC BAG, INJECTION (ML) EPIDURAL
Status: COMPLETED
Start: 2022-11-05 | End: 2022-11-05

## 2022-11-05 RX ORDER — OXYTOCIN/0.9 % SODIUM CHLORIDE 30/500 ML
PLASTIC BAG, INJECTION (ML) INTRAVENOUS
Status: COMPLETED
Start: 2022-11-05 | End: 2022-11-05

## 2022-11-05 RX ADMIN — Medication 5 ML: at 14:36

## 2022-11-05 RX ADMIN — Medication: at 14:37

## 2022-11-05 RX ADMIN — Medication 5 ML: at 14:39

## 2022-11-05 RX ADMIN — BUPRENORPHINE AND NALOXONE 1 FILM: 8; 2 FILM BUCCAL; SUBLINGUAL at 12:09

## 2022-11-05 RX ADMIN — SODIUM CHLORIDE, POTASSIUM CHLORIDE, SODIUM LACTATE AND CALCIUM CHLORIDE: 600; 310; 30; 20 INJECTION, SOLUTION INTRAVENOUS at 16:15

## 2022-11-05 RX ADMIN — SODIUM CHLORIDE, POTASSIUM CHLORIDE, SODIUM LACTATE AND CALCIUM CHLORIDE: 600; 310; 30; 20 INJECTION, SOLUTION INTRAVENOUS at 15:10

## 2022-11-05 RX ADMIN — Medication 2 MILLI-UNITS/MIN: at 15:06

## 2022-11-05 RX ADMIN — SODIUM CHLORIDE, POTASSIUM CHLORIDE, SODIUM LACTATE AND CALCIUM CHLORIDE 1000 ML: 600; 310; 30; 20 INJECTION, SOLUTION INTRAVENOUS at 13:40

## 2022-11-05 RX ADMIN — FLUOXETINE HYDROCHLORIDE 40 MG: 20 CAPSULE ORAL at 12:07

## 2022-11-05 ASSESSMENT — ACTIVITIES OF DAILY LIVING (ADL)
ADLS_ACUITY_SCORE: 18

## 2022-11-05 NOTE — PROGRESS NOTES
"Labor progress note    S:  Resting.    O:  Blood pressure 120/71, temperature 97.9  F (36.6  C), temperature source Oral, resp. rate 18, height 1.6 m (5' 3\"), weight 108 kg (238 lb), last menstrual period 01/25/2022, not currently breastfeeding.  General appearance: comfortable per RN  Contractions: Every 2-3 minutes. 60-80 seconds duration.    FHT: Baseline 140 with moderate variability. Accelerations present. No decelerations present.  ROM: not ruptured.   Pelvic exam: deferred.   -------------------------------  Pitocin- none,  Antibiotics- none    A:  IUP @ 40w4d IOL   Fetal Heart rate tracing Category one when traced.   GBS- negative  Patient Active Problem List   Diagnosis     History of Depression      Suboxone maintenance treatment complicating pregnancy, antepartum, unspecified trimester (H)     Supervision of high risk pregnancy, antepartum     BMI 40.0-44.9, adult (H)     Polyhydramnios in third trimester     Labor and delivery indication for care or intervention         P:  Contractions regular though not felt by patient. Hold PV miso #2 for 1hr with plan to reassess if it can be dosed.     OMERO Reed CNM    "

## 2022-11-05 NOTE — PROGRESS NOTES
Data: Patient admitted to room 444 at . Patient is a . Prenatal record reviewed.   OB History    Para Term  AB Living   2 1 1 0 0 1   SAB IAB Ectopic Multiple Live Births   0 0 0 0 1      # Outcome Date GA Lbr Marco Antonio/2nd Weight Sex Delivery Anes PTL Lv   2 Current            1 Term 20 39w2d 04:47 / 00:13 3.033 kg (6 lb 11 oz) F Vag-Spont EPI N RAGINI      Name: Mignon      Apgar1: 9  Apgar5: 9   .  Medical History:   Past Medical History:   Diagnosis Date     Asthma     does not use inhaler, dx'd      Depressive disorder     h/o depression and suidide attempt      Varicella    .  Gestational age 40w3d. Vital signs per doc flowsheet. Fetal movement present. Patient reports Induction Of Labor   as reason for admission. Support persons Herve present.  Action: Verbal consent for EFM, external fetal monitors applied. Admission assessment completed. Discussed obtaining urine tox screen due to enrollment in MAT program- consent received. Patient and support persons educated on labor process. Patient instructed to report change in fetal movement, contractions, vaginal leaking of fluid or bleeding, abdominal pain, or any concerns related to the pregnancy to her nurse/midwife. Patient oriented to room, call light in reach.   Response: EM Bullard CNM informed of arrival. Plan pending CNM evaluation.

## 2022-11-05 NOTE — PROVIDER NOTIFICATION
11/05/22 1546 11/05/22 1554   Vital Signs   Oximeter Heart Rate 83 bpm 74 bpm   BP (!) 142/85 112/69     Pt had one elevated BP at 142/85. Provider notified. Writer adjusted cuff and rechecked. BP resolved to 112/69.

## 2022-11-05 NOTE — PROVIDER NOTIFICATION
11/05/22 0345   Provider Notification   Provider Name/Title EM Bullard CNM   Method of Notification Phone   Request Evaluate - Remote   Polina is peterson every 2-3 minutes. Plan to hold vaginal misoprostol dose. Per pharmacist, suboxone dose ordered to match outside pharmacy prescription.

## 2022-11-05 NOTE — PLAN OF CARE
Labor Shift Note  Data: Contraction irregular 1.5-5 minutes apart lasting  seconds, palpate mild to moderate. Fetal assessment category one.   Vitals:    22 1448 22 1455 22 1500 22 1505   BP: 120/65 118/69 118/62 109/59   Resp: 18 18 18 18   Temp:       TempSrc:       SpO2: 97% 97% 97% 97%   Weight:       Height:       . No intake/output data recorded..  Leaking small amounts of clear fluid.  Signs and symptoms of infection absent.  Blood pressures WNL. Signs and symptoms of pre-eclampsia: absent.  Support person  Herve present.  Interventions: Continue uterine/fetal assessment continuous until delivery. Vital Signs per order set. Comfort measures repositioning.  Medicated for Epidural.  Plan: Anticipate . Provide labor/coping assistance as needed by patient and support person.  Observe for and notify care provider of indications of progressing labor, need for pain medications, or signs of fetal/maternal compromise.

## 2022-11-05 NOTE — PLAN OF CARE
Goal Outcome Evaluation:      Plan of Care Reviewed With: patient, significant other    Overall Patient Progress: improvingOverall Patient Progress: improving     Responding well to vaginal misoprostol x1. Uterine contractions too frequent for 2nd dose. Using warm packs and resting for comfort. FHTs category 1. Plan for SVE to assess labor progression. Epidural per Pt request.       Breath sounds clear and equal bilaterally.

## 2022-11-05 NOTE — PROVIDER NOTIFICATION
11/05/22 0510   Provider Notification   Provider Name/Title EM Bullard CNM   Method of Notification At Bedside   Continuing to hold 2nd dose of vaginal cytotec due to frequent/ regular contractions every 1-5 minutes. Mild on palpation. Polina reports increased cramping. FHTs category 1. Plan for CNM to reevaluate in approximately 1 hour.

## 2022-11-05 NOTE — PROVIDER NOTIFICATION
11/05/22 1430   Uterine Activity Assessment   Method external tocotransducer;palpation   Contraction Frequency (Minutes) 3-7   Contraction Duration (seconds) 60   Contraction Intensity mild by palpation   Uterine Resting Tone soft by palpation   Fetal Assessment   Fetal Movement active   Fetal HR Assessment Method external US   Fetal HR (beats/min) 135   Fetal HR Baseline normal range   Fetal HR Variability moderate (amplitude range 6 to 25 bpm)   RN Palpated pts' stomach during placement of epidural. Could not get continuous heart tones in upright epidural position so spot checked several times during procedure.

## 2022-11-05 NOTE — PLAN OF CARE
VSS. Pt did have one elevated BP but resolved after recheck, provider notified. Pitocin running and increased as tolerated.  Pt feels sufficient relief from pain with epidural, is resting in bed. Coping with labor with breathing, position changes as tolerated and distraction. Pt educated about when to report changes to care team. Report given to oncoming RN.

## 2022-11-05 NOTE — ANESTHESIA PROCEDURE NOTES
Dural puncture epidural Procedure Note    Pre-Procedure   Staff -        Anesthesiologist:  Hubert Dorantes MD       Resident/Fellow: Jay Howell MD       Performed By: resident       Location: OB       Procedure Start/Stop Times: 11/5/2022 2:14 PM and 11/5/2022 2:34 PM       Pre-Anesthestic Checklist: patient identified, IV checked, risks and benefits discussed, informed consent, monitors and equipment checked, pre-op evaluation, at physician/surgeon's request and post-op pain management  Timeout:       Correct Patient: Yes        Correct Procedure: Yes        Correct Site: Yes        Correct Position: Yes   Procedure Documentation  Procedure: dural puncture epidural       Diagnosis: Labor analgesia       Patient Position: sitting       Skin prep: Chloraprep       Local skin infiltrated with 2 mL of 1% lidocaine.        Insertion Site: L4-5. (midline approach).       Technique: LORT saline        CALLI at 8.5 cm.       Needle Type: Seva Coffee       Needle Gauge: 17.        Needle Length (Inches): 3.5        Spinal Needle Type: Pencan       Spinal Needle (gauge): 25        Spinal Needle Length (inches): 4.69       Catheter: 19 G.          Catheter threaded easily.         5.5 cm epidural space.         Threaded 14 cm at skin.         # of attempts: 1 and  # of redirects:  2    Assessment/Narrative         Paresthesias: No.       Test dose of 3 mL lidocaine 1.5% w/ 1:200,000 epinephrine at 12:44 CDT.         Test dose negative, 3 minutes after injection, for signs of intravascular, subdural, or intrathecal injection.       Insertion/Infusion Method: LORT saline       Aspiration negative for Heme or CSF via Epidural Catheter.       Sensory Level Left: T8.       Sensory Level Right: T8.       CSF fluid: with Spinal needle.CSF fluid removed: with Epidural needle - not with Epidural needle.    Medication(s) Administered   Medication Administration Time: 11/5/2022 2:14 PM     Comments:  Symmetric levels approximately T8.  "Comfortable. Hemodynamically stable.       FOR [facility] ONLY:   Pain Team Contact information: please page the Pain Team Via [facility]. Search \"Pain\". During daytime hours, please page the attending first. At night please page the resident first.    "

## 2022-11-05 NOTE — PROGRESS NOTES
"Labor progress note    S:  Resting between contractions. Reports she is feeling them and they are starting to get a bit stronger.     O:  Blood pressure 121/72, temperature (P) 98.5  F (36.9  C), temperature source (P) Oral, resp. rate (P) 18, height 1.6 m (5' 3\"), weight 108 kg (238 lb), last menstrual period 01/25/2022, not currently breastfeeding.  General appearance: comfortable.  Contractions: Every 2-4 minutes.  seconds duration.    FHT: Baseline 130 with moderate variability. Accelerations present. No decelerations traced.   ROM: not ruptured.   Pelvic exam: deferred   -------------------------------  Pitocin- none,  Antibiotics- none    A:  IUP @ 40w4d IOL   Fetal Heart rate tracing Category one  GBS- negative  S/p PV miso x1 dose  Patient Active Problem List   Diagnosis     History of Depression      Suboxone maintenance treatment complicating pregnancy, antepartum, unspecified trimester (H)     Supervision of high risk pregnancy, antepartum     BMI 40.0-44.9, adult (H)     Polyhydramnios in third trimester     Labor and delivery indication for care or intervention         P:  comfort measures prn   Expectant management d/t peterson too much for another dose of miso  Reassess in 1-2 hours or sooner prn.      OMERO Reed CNM    "

## 2022-11-05 NOTE — ANESTHESIA PREPROCEDURE EVALUATION
Anesthesia Pre-Procedure Evaluation    Patient: Polina Barrientos   MRN: [id number] : 2000        Procedure : Epidural          Past Medical History:   Diagnosis Date     Asthma     does not use inhaler, dx'd      Depressive disorder     h/o depression and suidide attempt 2015     Varicella       No past surgical history on file.   No Known Allergies   Social History     Tobacco Use     Smoking status: Never     Smokeless tobacco: Never   Substance Use Topics     Alcohol use: Not Currently     Comment: occasionally      Wt Readings from Last 1 Encounters:   22 108 kg (238 lb)        Anesthesia Evaluation   Pt has had prior anesthetic. Type: OB Labor Epidural.        ROS/MED HX  ENT/Pulmonary:     (+) asthma ( does not need to use inhaler)     Neurologic:  - neg neurologic ROS     Cardiovascular:       METS/Exercise Tolerance:     Hematologic:     (+) no thrombocytopenia ( 248), anemia ( 10.7),     Musculoskeletal:       GI/Hepatic:  - neg GI/hepatic ROS     Renal/Genitourinary:       Endo:     (+) Obesity,     Psychiatric/Substance Use: Comment: Hx BERT on suboxone    (+) psychiatric history anxiety and depression     Infectious Disease:       Malignancy:       Other:   Mild polyhydramnios- resolved   Fetal pyelectasis - resolved         Physical Exam    Airway        Mallampati: III   TM distance: > 3 FB   Neck ROM: full   Mouth opening: > 3 cm    Respiratory Devices and Support         Dental  no notable dental history         Cardiovascular   cardiovascular exam normal          Pulmonary   pulmonary exam normal                OUTSIDE LABS:  CBC:   Lab Results   Component Value Date    WBC 11.9 (H) 2022    WBC 9.2 2022    HGB 10.7 (L) 2022    HGB 12.3 2022    HCT 34.4 (L) 2022    HCT 37.6 2022     2022     2022     BMP:   Lab Results   Component Value Date     2020     10/17/2015    POTASSIUM 3.6 2020     POTASSIUM 4.0 10/17/2015    CHLORIDE 110 (H) 12/23/2020    CHLORIDE 108 10/17/2015    CO2 28 12/23/2020    CO2 30 10/17/2015    BUN 11 12/23/2020    BUN 9 10/17/2015    CR 0.56 12/23/2020    CR 0.48 (L) 11/11/2020    GLC 83 12/23/2020    GLC 92 10/17/2015     COAGS:   Lab Results   Component Value Date    PTT 30 12/23/2020    INR 1.00 12/23/2020     POC:   Lab Results   Component Value Date    HCG Negative 05/05/2017    HCGS Negative 12/23/2020     HEPATIC:   Lab Results   Component Value Date    ALBUMIN 3.5 12/23/2020    PROTTOTAL 7.2 12/23/2020    ALT 20 12/23/2020    AST 6 12/23/2020    ALKPHOS 88 12/23/2020    BILITOTAL 0.3 12/23/2020     OTHER:   Lab Results   Component Value Date    MARLON 8.8 12/23/2020    TSH 1.50 10/16/2015       Anesthesia Plan    ASA Status:  3, emergent    NPO Status:  ELEVATED Aspiration Risk/Unknown    Anesthesia Type: Epidural.              Consents    Anesthesia Plan(s) and associated risks, benefits, and realistic alternatives discussed. Questions answered and patient/representative(s) expressed understanding.    - Discussed:     - Discussed with:  Patient         Postoperative Care            Comments:                Jay Howell MD

## 2022-11-05 NOTE — PROVIDER NOTIFICATION
11/04/22 2205   Provider Notification   Provider Name/Title EM Bullard CNM   Method of Notification At Bedside   CNM at bedside evaluating Polina for IOL. BSUS confirms cephalic position. SVE- CNM unable to reach internal os. Outer os 1 cm. Sanford score 1. Plan for vaginal cytotec. IOL process reviewed by CNM. Polina and LIZZIE Edgar in agreement with POC.

## 2022-11-05 NOTE — PROVIDER NOTIFICATION
11/05/22 1139   Provider Notification   Provider Name/Title FELICITY Birch   Method of Notification At Bedside   Notification Reason SVE   L Queta here to perform AROM, fluid clear moderate amount noted. Cervix is now 4/50/-1 cm.  FHR stable after procedure. Will continue to observe for progress.

## 2022-11-05 NOTE — PROGRESS NOTES
"  SUBJECTIVE:  ==============  Polina Barrientos is a 22 year old  at 40w4d gestation here for IOL d/t BMI<40  General appearance: Now resting comfortably following epidural placement.    Support: Herve is at bedside      OBJECTIVE:  ==============  VITALS  Blood pressure 124/67, temperature 98.5  F (36.9  C), temperature source Oral, resp. rate 18, height 1.6 m (5' 3\"), weight 108 kg (238 lb), last menstrual period 2022, SpO2 98 %, not currently breastfeeding.    FETAL HEART RATE ASSESSMENT:  Baseline rate 135, normal  Variability moderate  Accelerations present   Decelerations not present       CONTRACTIONS: Contractions every 2-5 minutes.    Pitocin- none,  Antibiotics- none    ROM: clear fluid  PELVIC EXAM:deferred  # Pain Assessment:  Current Pain Score 2022   Patient currently in pain? yes   Pain score (0-10) -   Pain location -   Pain descriptors -       Assessment: EFM interpretation suggests absence of concern for fetal metabolic acidemia at this time due to moderate variability and accelerations present    Labor course: Polina is currently resting comfortably with her epidural. Miso x1 overnight with frequent contractions; unable to have second dose. On exam at 1140, 4-5/50/-2; AROM at 1144. Expectant management following AROM.      ASSESSMENT:  ==============  Polina Barrientos  22 year old  female  Estimated Date of Delivery: 2022  IUP @ 40w4d IOL for BMI <40  in active labor   Fetal Heart Rate Tracing category one over the last 20 minutes  GBS- negative  Neg COVID PCR test  ROMx 3 hours    Patient Active Problem List   Diagnosis     History of Depression      Suboxone maintenance treatment complicating pregnancy, antepartum, unspecified trimester (H)     Supervision of high risk pregnancy, antepartum     BMI 40.0-44.9, adult (H)     Polyhydramnios in third trimester     Labor and delivery indication for care or intervention      "     PLAN:  ===========  -Discussed pitocin augmentation to achieve adequate contraction pattern  -Advised to alert nursing staff if she experiences urge to push or rectal pressure.  -Frequent position changes to facilitate labor with epidural anesthesia.  -Pain medication- Epidural   -Anticipate progress and NSVB.   -Reevaluate progress as clinically indicated.     I, Marilynn Huang, completed the PFSH and ROS. I then acted as a scribe for CNM for the remainder of the visit. - Marilynn Huang RN SNM     I agree with the PFSH and ROS as completed by the SNM, except for changes made by me. The remainder of the encounter was performed by me and scribed by the SNM. The scribed note accurately reflects my personal services and decisions made by me.  OMERO SaenzM

## 2022-11-05 NOTE — PROGRESS NOTES
"  SUBJECTIVE:  ==============  Polina Barrientos is [facility] the [facility] for IOL secondary to BMI>40. Pregnancy complicated by polyhydramnios in 3rd trimester and suboxone maintenance therapy.  General appearance: comfortable  Support: Herve      OBJECTIVE:  ==============  VITALS  Blood pressure 121/72, temperature 98.5  F (36.9  C), temperature source Oral, resp. rate 18, height 1.6 m (5' 3\"), weight 108 kg (238 lb), last menstrual period 2022, not currently breastfeeding.    FETAL HEART RATE ASSESSMENT:  Baseline rate 135, normal  Variability moderate  Accelerations present   Decelerations not present       CONTRACTIONS: Contractions every 4-5 minutes.  Palpate: mild  Pitocin- none,  Antibiotics- none    ROM: not ruptured  PELVIC EXAM:deferred  # Pain Assessment:  Current Pain Score 2022   Patient currently in pain? denies   Pain score (0-10) -   Pain location -   Pain descriptors -       Assessment: EFM interpretation suggests absence of concern for fetal metabolic acidemia at this time due to moderate variability and accelerations present    Labor course: Cervical ripening using vaginal misoprostol started at 2340 last night. After administration patient began peterson regularly and too frequently to receive another dose.     ASSESSMENT:  ==============  Polina Barrientos  22 year old  female  Estimated Date of Delivery: 2022  IUP @ 40w4d admitted for induction of labor.  Indication: BMI <40   Fetal Heart Rate Tracing category one over the last 60 minutes  GBS- negative  Neg COVID PCR test    Patient Active Problem List   Diagnosis     History of Depression      Suboxone maintenance treatment complicating pregnancy, antepartum, unspecified trimester (H)     Supervision of high risk pregnancy, antepartum     BMI 40.0-44.9, adult (H)     Polyhydramnios in third trimester     Labor and delivery indication for care or intervention     Discussed suboxone dose, " patient reports home dosing of  8-2 mg film at 1300 and 4-1 mg film at 0000. Call placed to pharmacy to confirm dosage and adjust timing.     PLAN:  ===========  -Discussed possibility of Cohen bulb or pitocin depending on next SVE  -Patient would like to wake up and have some breakfast prior to exam and any intervention  -Anticipate progress and NSVB.   -Reassess plan for labor induction when patient ready.    I, Marilynn Huang, completed the PFSH and ROS. I then acted as a scribe for CNM for the remainder of the visit. - Marilynn Huang, RN, SNM    I agree with the PFSH and ROS as completed by the SNM, except for changes made by me. The remainder of the encounter was performed by me and scribed by the SNM. The scribed note accurately reflects my personal services and decisions made by me.  OMERO Saenz CNELLIOTT

## 2022-11-05 NOTE — PROGRESS NOTES
"Labor progress note    S:  Patient now ready for cervical exam to determine further plan.  Receptive to Cohen balloon placement or amniotomy if able.  Feels like cramping is more manageable after showering.    O:  Blood pressure 121/72, temperature 98.5  F (36.9  C), temperature source Oral, resp. rate 18, height 1.6 m (5' 3\"), weight 108 kg (238 lb), last menstrual period 2022, not currently breastfeeding.  General appearance: comfortable.  Contractions: Every 3-5 minutes. 60-90 seconds duration.  Palpate: mild and moderate.  FHT: Baseline 135 with moderate variability. Accelerations are present. No decelerations present.  ROM: amniotomy performed with verbal consent, moderate amount of clear fluid.   Pelvic exam: 4.5/ 50%/ Mid/ average/ -2 well applied  Sanford Score: 6  Pitocin- none,  Antibiotics- none      A:  IUP @ 40w4d IOL for BMI   Fetal Heart rate tracing Category one  GBS- negative  Patient Active Problem List   Diagnosis     History of Depression      Suboxone maintenance treatment complicating pregnancy, antepartum, unspecified trimester (H)     Supervision of high risk pregnancy, antepartum     BMI 40.0-44.9, adult (H)     Polyhydramnios in third trimester     Labor and delivery indication for care or intervention         P:  Anticipate   reevaluate in 2-4 hours/PRN  Consider Pitocin induction if contraction pattern spaces or inadequate following amniotomy.       OMERO SaenzM  "

## 2022-11-05 NOTE — H&P
ADMIT NOTE  =================  40w3d    Polina Barrientos is a 22 year old female with an Patient's last menstrual period was 2022. and Estimated Date of Delivery: 2022 is [facility] 2022 at 9:38 PM for induction of labor.  Indication: BMI >40.     HPI  ================  Patient presents for scheduled IOL d/t pre-preg BMI >40. Pregnancy has been complicated by inconsistent prenatal care (some [facility], some [facility]), mild polyhydramnios (resolved as of last BPP 22), fetal pyelectasis (resolved 10/12/22), and BERT (stable on Suboxone). She reports she is taking 8mg in the afternoon and 4mg around midnight for her Suboxone dosing. She also endorses taking 40mg of fluoxetine for mood. She is here with her partner Herve.     Reviewed recent [facility] labs: 1hr , GBS neg, O+, Hep B NR, Hgb 10.9, RPR NR, UDS appropriate (on Suboxone),   Last growth US 10/12/22: growth 82%ile, AC 98%  Rec'd Tdap     Denies fever, cough, SOB or chest pain. Denies having contact with anyone who is Covid-19 positive. Pt has not had Covid-19 Vaccinations.   Contractions- irreg  Fetal movement- active  ROM- no.  Vaginal bleeding- none  GBS- negative  FOB- is involved, Herve    Weight gain- 238 - 234 lbs, Total weight gain- 4 lbs  Height- 5ft 3in  BMI- 42  First prenatal visit at 9 weeks, Total visits- 6?    PROBLEM LIST  =================  Patient Active Problem List    Diagnosis Date Noted     Polyhydramnios in third trimester 2022     Priority: Medium     22 Moderate poly noted on U/S, pt has not been seen in clinic since 19 wks, has been following at [facility], no glucose screening as of yet. Message sent to scheduling to get her back into clinic  SOFY to [facility]        Supervision of high risk pregnancy, antepartum 2022     Priority: Medium     FOB: Herve  daughter: Mignon conti  Suboxone use   Mild dilation of fetal kidney, resolved  declined genetic screening        BMI 40.0-44.9,  adult (H) 2022     Priority: Medium     Prepregnant BMI 42  Level 2 U/S, plan growth in 3rd trimester and serial BPPs starting @ 36 wks   11/3 Per MFM advise IOL        Suboxone maintenance treatment complicating pregnancy, antepartum, unspecified trimester (H) 2022     Priority: Medium     Per [facility]  Stable on Suboxone 12mg AM 8 mg PM        History of Depression  2013     Priority: Medium       HISTORIES  ============  No Known Allergies  Past Medical History:   Diagnosis Date     Asthma     does not use inhaler, dx'd      Depressive disorder     h/o depression and suidide attempt 2015     Varicella      No past surgical history on file..  Family History   Problem Relation Age of Onset     Diabetes Father      Social History     Tobacco Use     Smoking status: Never     Smokeless tobacco: Never   Substance Use Topics     Alcohol use: Not Currently     Comment: occasionally     OB History    Para Term  AB Living   2 1 1 0 0 1   SAB IAB Ectopic Multiple Live Births   0 0 0 0 1      # Outcome Date GA Lbr Marco Antonio/2nd Weight Sex Delivery Anes PTL Lv   2 Current            1 Term 20 39w2d 04:47 / 00:13 3.033 kg (6 lb 11 oz) F Vag-Spont EPI N RAGINI      Name: Mignon      Apgar1: 9  Apgar5: 9        LABS:   ===========  Prenatal Labs:  Rhogam not indicated   Lab Results   Component Value Date    ABO O 2020    RH Pos 2020    AS Negative 2022    RUQIGG Positive 2022    HEPBANG Nonreactive 2022    HGB 12.3 2022    HIAGAB Nonreactive 2022     Rubella immune  Lab Results   Component Value Date    GBS Negative 2020     Other labs:  COVID-19 PCR Results    COVID-19 PCR Results 20    1137 1137   COVID-19 Virus PCR to [facility] - Result Test received-See reflex to IDDL test SARS CoV2 (COVID-19) Virus RT-PCR    COVID-19 Virus PCR to [facility] - Source Nasopharyngeal    SARS-CoV-2 Virus Specimen Source  Nasopharyngeal   SARS-CoV-2 PCR  "Result  NEGATIVE      Comments are available for some flowsheets but are not being displayed.         COVID-19 Antibody Results, Testing for Immunity    COVID-19 Antibody Results, Testing for Immunity   No data to display.            No results found for this or any previous visit (from the past 24 hour(s)).    ROS  =========  Pt denies significant respiratory, cardiovacular, GI, or muscular/skeletalcomplaints.    See RN data base ROS.     PHYSICAL EXAM:  ===============  /65   Temp 98  F (36.7  C) (Axillary)   Resp 18   Ht 1.6 m (5' 3\")   Wt 108 kg (238 lb)   LMP 01/25/2022   BMI 42.16 kg/m    General appearance: comfortable  GENERAL APPEARANCE: healthy, alert and no distress  ABDOMEN:  soft, nontender, no epigastric pain  SKIN: no suspicious lesions or rashes  NEURO: Denies headache, blurred vision, other vision changes  PSYCH: mentation appears normal. and affect normal/bright  Legs: 1+ edema      Abdomen: gravid, vertex fetus per Leopold's, non-tender between contractions.   Cephalic presentation confirmed by BSUS  EFW-  8 lbs.   CONTRACTIONS: irreg  FETAL HEART TONES: continuous EFM- baseline 130 with moderate variability and positive accelerations. No decelerations.  PELVIC EXAM: closed/ 30%/ Posterior/ average/ -3   ESPINOSA SCORE: 1  BLOODY SHOW: no   ROM:no  FLUID: none  ROMPLUS: not done    # Pain Assessment:  Current Pain Score 11/4/2022   Patient currently in pain? denies   Pain score (0-10) -   Pain location -   Pain descriptors -   Polina sol pain level was assessed and she currently denies pain.        ASSESSMENT:  ==============  IUP @ 40w3d admitted for induction of labor.  Indication: maternal indication BMI   NST REACTIVE  Fetal Heart Tones - category one  GBS- negative  Covid- negative per patient (will look for further [facility] records  Patient Active Problem List   Diagnosis     History of Depression      Suboxone maintenance treatment complicating pregnancy, antepartum, unspecified " trimester (H)     Supervision of high risk pregnancy, antepartum     BMI 40.0-44.9, adult (H)     Polyhydramnios in third trimester       PLAN:  ===========  Admit - see IP orders  pain medication options of nitrous oxide and epidural anesthesia reviewed with pt. Pt is interested in epidural  cervical ripening with vaginal Misoprostol risks and benefits reviewed with pt. Agreeable to plan.  Ambulation, hydration, position changes, birthing ball and tub options to facilitate labor reviewed with pt .  Anticipate   Opal Bullard, OMERO CNM

## 2022-11-06 PROCEDURE — 250N000011 HC RX IP 250 OP 636: Performed by: ANESTHESIOLOGY

## 2022-11-06 PROCEDURE — 999N000141 HC STATISTIC PRE-PROCEDURE NURSING ASSESSMENT: Performed by: OBSTETRICS & GYNECOLOGY

## 2022-11-06 PROCEDURE — 250N000013 HC RX MED GY IP 250 OP 250 PS 637: Performed by: REGISTERED NURSE

## 2022-11-06 PROCEDURE — 710N000010 HC RECOVERY PHASE 1, LEVEL 2, PER MIN: Performed by: OBSTETRICS & GYNECOLOGY

## 2022-11-06 PROCEDURE — 360N000076 HC SURGERY LEVEL 3, PER MIN: Performed by: OBSTETRICS & GYNECOLOGY

## 2022-11-06 PROCEDURE — 370N000017 HC ANESTHESIA TECHNICAL FEE, PER MIN: Performed by: OBSTETRICS & GYNECOLOGY

## 2022-11-06 PROCEDURE — 250N000009 HC RX 250

## 2022-11-06 PROCEDURE — 250N000011 HC RX IP 250 OP 636: Performed by: STUDENT IN AN ORGANIZED HEALTH CARE EDUCATION/TRAINING PROGRAM

## 2022-11-06 PROCEDURE — 250N000013 HC RX MED GY IP 250 OP 250 PS 637: Performed by: STUDENT IN AN ORGANIZED HEALTH CARE EDUCATION/TRAINING PROGRAM

## 2022-11-06 PROCEDURE — 120N000002 HC R&B MED SURG/OB UMMC

## 2022-11-06 PROCEDURE — 999N000016 HC STATISTIC ATTENDANCE AT DELIVERY

## 2022-11-06 PROCEDURE — 271N000001 HC OR GENERAL SUPPLY NON-STERILE: Performed by: OBSTETRICS & GYNECOLOGY

## 2022-11-06 PROCEDURE — C9290 INJ, BUPIVACAINE LIPOSOME: HCPCS | Performed by: ANESTHESIOLOGY

## 2022-11-06 PROCEDURE — 250N000011 HC RX IP 250 OP 636

## 2022-11-06 PROCEDURE — 272N000001 HC OR GENERAL SUPPLY STERILE: Performed by: OBSTETRICS & GYNECOLOGY

## 2022-11-06 PROCEDURE — 59514 CESAREAN DELIVERY ONLY: CPT | Mod: GC | Performed by: OBSTETRICS & GYNECOLOGY

## 2022-11-06 PROCEDURE — 258N000003 HC RX IP 258 OP 636: Performed by: ADVANCED PRACTICE MIDWIFE

## 2022-11-06 RX ORDER — OXYTOCIN 10 [USP'U]/ML
10 INJECTION, SOLUTION INTRAMUSCULAR; INTRAVENOUS
Status: DISCONTINUED | OUTPATIENT
Start: 2022-11-06 | End: 2022-11-06 | Stop reason: HOSPADM

## 2022-11-06 RX ORDER — OXYCODONE HYDROCHLORIDE 5 MG/1
5 TABLET ORAL EVERY 4 HOURS PRN
Status: DISCONTINUED | OUTPATIENT
Start: 2022-11-06 | End: 2022-11-06

## 2022-11-06 RX ORDER — LIDOCAINE HYDROCHLORIDE 20 MG/ML
INJECTION, SOLUTION EPIDURAL; INFILTRATION; INTRACAUDAL; PERINEURAL PRN
Status: DISCONTINUED | OUTPATIENT
Start: 2022-11-06 | End: 2022-11-06

## 2022-11-06 RX ORDER — SODIUM CHLORIDE, SODIUM LACTATE, POTASSIUM CHLORIDE, CALCIUM CHLORIDE 600; 310; 30; 20 MG/100ML; MG/100ML; MG/100ML; MG/100ML
INJECTION, SOLUTION INTRAVENOUS CONTINUOUS
Status: DISCONTINUED | OUTPATIENT
Start: 2022-11-06 | End: 2022-11-06 | Stop reason: HOSPADM

## 2022-11-06 RX ORDER — MORPHINE SULFATE 1 MG/ML
INJECTION, SOLUTION EPIDURAL; INTRATHECAL; INTRAVENOUS PRN
Status: DISCONTINUED | OUTPATIENT
Start: 2022-11-06 | End: 2022-11-06

## 2022-11-06 RX ORDER — NALOXONE HYDROCHLORIDE 1 MG/ML
0.2 INJECTION INTRAMUSCULAR; INTRAVENOUS; SUBCUTANEOUS
Status: DISCONTINUED | OUTPATIENT
Start: 2022-11-06 | End: 2022-11-08 | Stop reason: HOSPADM

## 2022-11-06 RX ORDER — CEFAZOLIN SODIUM/WATER 2 G/20 ML
2 SYRINGE (ML) INTRAVENOUS
Status: DISCONTINUED | OUTPATIENT
Start: 2022-11-06 | End: 2022-11-06 | Stop reason: HOSPADM

## 2022-11-06 RX ORDER — METOCLOPRAMIDE HYDROCHLORIDE 5 MG/ML
10 INJECTION INTRAMUSCULAR; INTRAVENOUS EVERY 6 HOURS PRN
Status: DISCONTINUED | OUTPATIENT
Start: 2022-11-06 | End: 2022-11-08 | Stop reason: HOSPADM

## 2022-11-06 RX ORDER — DEXTROSE, SODIUM CHLORIDE, SODIUM LACTATE, POTASSIUM CHLORIDE, AND CALCIUM CHLORIDE 5; .6; .31; .03; .02 G/100ML; G/100ML; G/100ML; G/100ML; G/100ML
INJECTION, SOLUTION INTRAVENOUS CONTINUOUS
Status: DISCONTINUED | OUTPATIENT
Start: 2022-11-06 | End: 2022-11-08 | Stop reason: HOSPADM

## 2022-11-06 RX ORDER — OXYTOCIN/0.9 % SODIUM CHLORIDE 30/500 ML
340 PLASTIC BAG, INJECTION (ML) INTRAVENOUS CONTINUOUS PRN
Status: DISCONTINUED | OUTPATIENT
Start: 2022-11-06 | End: 2022-11-08 | Stop reason: HOSPADM

## 2022-11-06 RX ORDER — CARBOPROST TROMETHAMINE 250 UG/ML
250 INJECTION, SOLUTION INTRAMUSCULAR
Status: DISCONTINUED | OUTPATIENT
Start: 2022-11-06 | End: 2022-11-08 | Stop reason: HOSPADM

## 2022-11-06 RX ORDER — OXYTOCIN/0.9 % SODIUM CHLORIDE 30/500 ML
100-340 PLASTIC BAG, INJECTION (ML) INTRAVENOUS CONTINUOUS PRN
Status: DISCONTINUED | OUTPATIENT
Start: 2022-11-06 | End: 2022-11-08 | Stop reason: HOSPADM

## 2022-11-06 RX ORDER — CARBOPROST TROMETHAMINE 250 UG/ML
250 INJECTION, SOLUTION INTRAMUSCULAR
Status: DISCONTINUED | OUTPATIENT
Start: 2022-11-06 | End: 2022-11-06 | Stop reason: HOSPADM

## 2022-11-06 RX ORDER — ACETAMINOPHEN 325 MG/1
975 TABLET ORAL EVERY 6 HOURS
Status: DISCONTINUED | OUTPATIENT
Start: 2022-11-06 | End: 2022-11-08 | Stop reason: HOSPADM

## 2022-11-06 RX ORDER — MISOPROSTOL 200 UG/1
800 TABLET ORAL
Status: DISCONTINUED | OUTPATIENT
Start: 2022-11-06 | End: 2022-11-06 | Stop reason: HOSPADM

## 2022-11-06 RX ORDER — ONDANSETRON 4 MG/1
4 TABLET, ORALLY DISINTEGRATING ORAL EVERY 30 MIN PRN
Status: DISCONTINUED | OUTPATIENT
Start: 2022-11-06 | End: 2022-11-06 | Stop reason: HOSPADM

## 2022-11-06 RX ORDER — BUPRENORPHINE AND NALOXONE 4; 1 MG/1; MG/1
1 FILM, SOLUBLE BUCCAL; SUBLINGUAL DAILY
Status: DISCONTINUED | OUTPATIENT
Start: 2022-11-06 | End: 2022-11-06

## 2022-11-06 RX ORDER — TRANEXAMIC ACID 10 MG/ML
1 INJECTION, SOLUTION INTRAVENOUS EVERY 30 MIN PRN
Status: DISCONTINUED | OUTPATIENT
Start: 2022-11-06 | End: 2022-11-06 | Stop reason: HOSPADM

## 2022-11-06 RX ORDER — PROCHLORPERAZINE 25 MG
25 SUPPOSITORY, RECTAL RECTAL EVERY 12 HOURS PRN
Status: DISCONTINUED | OUTPATIENT
Start: 2022-11-06 | End: 2022-11-08 | Stop reason: HOSPADM

## 2022-11-06 RX ORDER — LIDOCAINE 40 MG/G
CREAM TOPICAL
Status: DISCONTINUED | OUTPATIENT
Start: 2022-11-06 | End: 2022-11-06 | Stop reason: HOSPADM

## 2022-11-06 RX ORDER — CEFAZOLIN SODIUM/WATER 2 G/20 ML
2 SYRINGE (ML) INTRAVENOUS SEE ADMIN INSTRUCTIONS
Status: DISCONTINUED | OUTPATIENT
Start: 2022-11-06 | End: 2022-11-06 | Stop reason: HOSPADM

## 2022-11-06 RX ORDER — ONDANSETRON 2 MG/ML
4 INJECTION INTRAMUSCULAR; INTRAVENOUS EVERY 6 HOURS PRN
Status: DISCONTINUED | OUTPATIENT
Start: 2022-11-06 | End: 2022-11-08 | Stop reason: HOSPADM

## 2022-11-06 RX ORDER — FENTANYL CITRATE 50 UG/ML
25 INJECTION, SOLUTION INTRAMUSCULAR; INTRAVENOUS EVERY 5 MIN PRN
Status: DISCONTINUED | OUTPATIENT
Start: 2022-11-06 | End: 2022-11-06 | Stop reason: HOSPADM

## 2022-11-06 RX ORDER — CITRIC ACID/SODIUM CITRATE 334-500MG
30 SOLUTION, ORAL ORAL
Status: COMPLETED | OUTPATIENT
Start: 2022-11-06 | End: 2022-11-06

## 2022-11-06 RX ORDER — BUPIVACAINE HYDROCHLORIDE 2.5 MG/ML
INJECTION, SOLUTION EPIDURAL; INFILTRATION; INTRACAUDAL PRN
Status: DISCONTINUED | OUTPATIENT
Start: 2022-11-06 | End: 2022-11-06

## 2022-11-06 RX ORDER — MISOPROSTOL 200 UG/1
800 TABLET ORAL
Status: DISCONTINUED | OUTPATIENT
Start: 2022-11-06 | End: 2022-11-08 | Stop reason: HOSPADM

## 2022-11-06 RX ORDER — NALOXONE HYDROCHLORIDE 1 MG/ML
0.4 INJECTION INTRAMUSCULAR; INTRAVENOUS; SUBCUTANEOUS
Status: DISCONTINUED | OUTPATIENT
Start: 2022-11-06 | End: 2022-11-08 | Stop reason: HOSPADM

## 2022-11-06 RX ORDER — KETOROLAC TROMETHAMINE 30 MG/ML
30 INJECTION, SOLUTION INTRAMUSCULAR; INTRAVENOUS EVERY 6 HOURS
Status: COMPLETED | OUTPATIENT
Start: 2022-11-06 | End: 2022-11-06

## 2022-11-06 RX ORDER — BISACODYL 10 MG
10 SUPPOSITORY, RECTAL RECTAL DAILY PRN
Status: DISCONTINUED | OUTPATIENT
Start: 2022-11-08 | End: 2022-11-08 | Stop reason: HOSPADM

## 2022-11-06 RX ORDER — AMOXICILLIN 250 MG
1 CAPSULE ORAL 2 TIMES DAILY
Status: DISCONTINUED | OUTPATIENT
Start: 2022-11-06 | End: 2022-11-08 | Stop reason: HOSPADM

## 2022-11-06 RX ORDER — SIMETHICONE 80 MG
80 TABLET,CHEWABLE ORAL 4 TIMES DAILY PRN
Status: DISCONTINUED | OUTPATIENT
Start: 2022-11-06 | End: 2022-11-08 | Stop reason: HOSPADM

## 2022-11-06 RX ORDER — METHYLERGONOVINE MALEATE 0.2 MG/ML
200 INJECTION INTRAVENOUS
Status: DISCONTINUED | OUTPATIENT
Start: 2022-11-06 | End: 2022-11-06 | Stop reason: HOSPADM

## 2022-11-06 RX ORDER — AZITHROMYCIN 500 MG/5ML
500 INJECTION, POWDER, LYOPHILIZED, FOR SOLUTION INTRAVENOUS
Status: DISCONTINUED | OUTPATIENT
Start: 2022-11-06 | End: 2022-11-06 | Stop reason: HOSPADM

## 2022-11-06 RX ORDER — MISOPROSTOL 200 UG/1
400 TABLET ORAL
Status: DISCONTINUED | OUTPATIENT
Start: 2022-11-06 | End: 2022-11-06 | Stop reason: HOSPADM

## 2022-11-06 RX ORDER — HYDROCORTISONE 25 MG/G
CREAM TOPICAL 3 TIMES DAILY PRN
Status: DISCONTINUED | OUTPATIENT
Start: 2022-11-06 | End: 2022-11-08 | Stop reason: HOSPADM

## 2022-11-06 RX ORDER — MISOPROSTOL 200 UG/1
400 TABLET ORAL
Status: DISCONTINUED | OUTPATIENT
Start: 2022-11-06 | End: 2022-11-08 | Stop reason: HOSPADM

## 2022-11-06 RX ORDER — BUPRENORPHINE AND NALOXONE 8; 2 MG/1; MG/1
1 FILM, SOLUBLE BUCCAL; SUBLINGUAL DAILY
Status: DISCONTINUED | OUTPATIENT
Start: 2022-11-06 | End: 2022-11-06

## 2022-11-06 RX ORDER — OXYTOCIN 10 [USP'U]/ML
10 INJECTION, SOLUTION INTRAMUSCULAR; INTRAVENOUS
Status: DISCONTINUED | OUTPATIENT
Start: 2022-11-06 | End: 2022-11-08 | Stop reason: HOSPADM

## 2022-11-06 RX ORDER — AMOXICILLIN 250 MG
2 CAPSULE ORAL 2 TIMES DAILY
Status: DISCONTINUED | OUTPATIENT
Start: 2022-11-06 | End: 2022-11-08 | Stop reason: HOSPADM

## 2022-11-06 RX ORDER — LIDOCAINE 40 MG/G
CREAM TOPICAL
Status: DISCONTINUED | OUTPATIENT
Start: 2022-11-06 | End: 2022-11-08 | Stop reason: HOSPADM

## 2022-11-06 RX ORDER — OXYCODONE HYDROCHLORIDE 5 MG/1
5 TABLET ORAL EVERY 4 HOURS PRN
Status: DISCONTINUED | OUTPATIENT
Start: 2022-11-06 | End: 2022-11-08 | Stop reason: HOSPADM

## 2022-11-06 RX ORDER — ONDANSETRON 2 MG/ML
4 INJECTION INTRAMUSCULAR; INTRAVENOUS EVERY 30 MIN PRN
Status: DISCONTINUED | OUTPATIENT
Start: 2022-11-06 | End: 2022-11-06 | Stop reason: HOSPADM

## 2022-11-06 RX ORDER — ACETAMINOPHEN 325 MG/1
975 TABLET ORAL ONCE
Status: COMPLETED | OUTPATIENT
Start: 2022-11-06 | End: 2022-11-06

## 2022-11-06 RX ORDER — TRANEXAMIC ACID 10 MG/ML
1 INJECTION, SOLUTION INTRAVENOUS EVERY 30 MIN PRN
Status: DISCONTINUED | OUTPATIENT
Start: 2022-11-06 | End: 2022-11-08 | Stop reason: HOSPADM

## 2022-11-06 RX ORDER — MODIFIED LANOLIN
OINTMENT (GRAM) TOPICAL
Status: DISCONTINUED | OUTPATIENT
Start: 2022-11-06 | End: 2022-11-08 | Stop reason: HOSPADM

## 2022-11-06 RX ORDER — METOCLOPRAMIDE 10 MG/1
10 TABLET ORAL EVERY 6 HOURS PRN
Status: DISCONTINUED | OUTPATIENT
Start: 2022-11-06 | End: 2022-11-08 | Stop reason: HOSPADM

## 2022-11-06 RX ORDER — IBUPROFEN 800 MG/1
800 TABLET, FILM COATED ORAL EVERY 6 HOURS
Status: DISCONTINUED | OUTPATIENT
Start: 2022-11-07 | End: 2022-11-08 | Stop reason: HOSPADM

## 2022-11-06 RX ORDER — OXYTOCIN/0.9 % SODIUM CHLORIDE 30/500 ML
340 PLASTIC BAG, INJECTION (ML) INTRAVENOUS CONTINUOUS PRN
Status: DISCONTINUED | OUTPATIENT
Start: 2022-11-06 | End: 2022-11-06 | Stop reason: HOSPADM

## 2022-11-06 RX ORDER — ONDANSETRON 4 MG/1
4 TABLET, ORALLY DISINTEGRATING ORAL EVERY 6 HOURS PRN
Status: DISCONTINUED | OUTPATIENT
Start: 2022-11-06 | End: 2022-11-08 | Stop reason: HOSPADM

## 2022-11-06 RX ORDER — BUPIVACAINE HYDROCHLORIDE 2.5 MG/ML
INJECTION, SOLUTION EPIDURAL; INFILTRATION; INTRACAUDAL
Status: DISCONTINUED | OUTPATIENT
Start: 2022-11-06 | End: 2022-11-06

## 2022-11-06 RX ORDER — PROCHLORPERAZINE MALEATE 10 MG
10 TABLET ORAL EVERY 6 HOURS PRN
Status: DISCONTINUED | OUTPATIENT
Start: 2022-11-06 | End: 2022-11-08 | Stop reason: HOSPADM

## 2022-11-06 RX ORDER — METHYLERGONOVINE MALEATE 0.2 MG/ML
200 INJECTION INTRAVENOUS
Status: DISCONTINUED | OUTPATIENT
Start: 2022-11-06 | End: 2022-11-08 | Stop reason: HOSPADM

## 2022-11-06 RX ADMIN — BUPIVACAINE HYDROCHLORIDE 40 ML: 2.5 INJECTION, SOLUTION EPIDURAL; INFILTRATION; INTRACAUDAL at 04:22

## 2022-11-06 RX ADMIN — BUPIVACAINE 20 ML: 13.3 INJECTION, SUSPENSION, LIPOSOMAL INFILTRATION at 04:22

## 2022-11-06 RX ADMIN — KETOROLAC TROMETHAMINE 30 MG: 30 INJECTION, SOLUTION INTRAMUSCULAR at 17:39

## 2022-11-06 RX ADMIN — OXYCODONE HYDROCHLORIDE 5 MG: 5 TABLET ORAL at 08:08

## 2022-11-06 RX ADMIN — ACETAMINOPHEN 975 MG: 325 TABLET, FILM COATED ORAL at 08:08

## 2022-11-06 RX ADMIN — OXYCODONE HYDROCHLORIDE 5 MG: 5 TABLET ORAL at 17:55

## 2022-11-06 RX ADMIN — BUPRENORPHINE AND NALOXONE 1 FILM: 8; 2 FILM BUCCAL; SUBLINGUAL at 14:09

## 2022-11-06 RX ADMIN — LIDOCAINE HYDROCHLORIDE 10 ML: 20 INJECTION, SOLUTION EPIDURAL; INFILTRATION; INTRACAUDAL; PERINEURAL at 03:33

## 2022-11-06 RX ADMIN — KETOROLAC TROMETHAMINE 30 MG: 30 INJECTION, SOLUTION INTRAMUSCULAR at 23:35

## 2022-11-06 RX ADMIN — OXYCODONE HYDROCHLORIDE 5 MG: 5 TABLET ORAL at 13:55

## 2022-11-06 RX ADMIN — SENNOSIDES AND DOCUSATE SODIUM 2 TABLET: 50; 8.6 TABLET ORAL at 11:02

## 2022-11-06 RX ADMIN — BUPRENORPHINE AND NALOXONE 1 FILM: 4; 1 FILM BUCCAL; SUBLINGUAL at 23:35

## 2022-11-06 RX ADMIN — SENNOSIDES AND DOCUSATE SODIUM 2 TABLET: 50; 8.6 TABLET ORAL at 19:34

## 2022-11-06 RX ADMIN — SIMETHICONE 80 MG: 80 TABLET, CHEWABLE ORAL at 13:55

## 2022-11-06 RX ADMIN — FLUOXETINE HYDROCHLORIDE 40 MG: 20 CAPSULE ORAL at 11:02

## 2022-11-06 RX ADMIN — LIDOCAINE HYDROCHLORIDE 10 ML: 20 INJECTION, SOLUTION EPIDURAL; INFILTRATION; INTRACAUDAL; PERINEURAL at 03:38

## 2022-11-06 RX ADMIN — FENTANYL CITRATE 25 MCG: 0.05 INJECTION, SOLUTION INTRAMUSCULAR; INTRAVENOUS at 05:55

## 2022-11-06 RX ADMIN — BUPIVACAINE HYDROCHLORIDE 5 ML: 2.5 INJECTION, SOLUTION EPIDURAL; INFILTRATION; INTRACAUDAL; PERINEURAL at 01:28

## 2022-11-06 RX ADMIN — SIMETHICONE 80 MG: 80 TABLET, CHEWABLE ORAL at 19:34

## 2022-11-06 RX ADMIN — KETOROLAC TROMETHAMINE 30 MG: 30 INJECTION, SOLUTION INTRAMUSCULAR at 11:02

## 2022-11-06 RX ADMIN — ACETAMINOPHEN 975 MG: 325 TABLET, FILM COATED ORAL at 02:44

## 2022-11-06 RX ADMIN — MORPHINE SULFATE 2 MG: 1 INJECTION EPIDURAL; INTRATHECAL; INTRAVENOUS at 04:30

## 2022-11-06 RX ADMIN — ACETAMINOPHEN 975 MG: 325 TABLET, FILM COATED ORAL at 19:34

## 2022-11-06 RX ADMIN — SODIUM CITRATE AND CITRIC ACID MONOHYDRATE 30 ML: 500; 334 SOLUTION ORAL at 02:45

## 2022-11-06 RX ADMIN — SODIUM CHLORIDE, POTASSIUM CHLORIDE, SODIUM LACTATE AND CALCIUM CHLORIDE: 600; 310; 30; 20 INJECTION, SOLUTION INTRAVENOUS at 00:13

## 2022-11-06 RX ADMIN — OXYCODONE HYDROCHLORIDE 5 MG: 5 TABLET ORAL at 22:11

## 2022-11-06 RX ADMIN — ACETAMINOPHEN 975 MG: 325 TABLET, FILM COATED ORAL at 13:55

## 2022-11-06 ASSESSMENT — ACTIVITIES OF DAILY LIVING (ADL)
ADLS_ACUITY_SCORE: 18
ADLS_ACUITY_SCORE: 22
ADLS_ACUITY_SCORE: 26
ADLS_ACUITY_SCORE: 22
ADLS_ACUITY_SCORE: 18
ADLS_ACUITY_SCORE: 26
ADLS_ACUITY_SCORE: 26
ADLS_ACUITY_SCORE: 18
ADLS_ACUITY_SCORE: 26
ADLS_ACUITY_SCORE: 26

## 2022-11-06 NOTE — PROVIDER NOTIFICATION
11/06/22 0101   Uterine Activity Assessment   Method external tocotransducer   Contraction Frequency (Minutes) 1-7   Contraction Duration (seconds)    Contraction Intensity moderate by palpation   Uterine Resting Tone soft by palpation   Fetal Assessment   Fetal Movement active   Fetal HR Assessment Method fetal spiral electrode   Fetal HR (beats/min) 135   Fetal HR Baseline normal range   Fetal HR Variability moderate (amplitude range 6 to 25 bpm)   Fetal HR Accelerations increase 15 bpm above baseline lasting 15 seconds   Fetal HR Decelerations variable   RN Strip Review Continuous     Column entered at 0101 is one hour after previous column entered at 0100 due to day light savings time.

## 2022-11-06 NOTE — DISCHARGE SUMMARY
[facility] Discharge Summary    Polina Barrientos MRN# [id number]   Age: 22 year old YOB: 2000     Date of Admission:  2022  Date of Discharge:  2022  Admitting Physician:  OMERO Milton CN  Discharge Physician:  Lianna Beltre MD    Admit Dx:   - Intrauterine pregnancy at 40w5d  - BERT  - MDD    Discharge Dx:  - Same as above, now  s/p primary low transverse  section    Procedures:  - Primary low transverse  section with two layer uterine closure via Pfannenstiel incision  - Epidural analgesia  - TAP block    Admit HPI:  Polina Barrientos is a 22 year old  at 40w5d admitted for IOL. She progressed with misoprostol, AROM and pitocin to 7cm. She had suboptimal pain control and requested to move forward with CS.  The risks, benefits, and alternatives of  section were discussed with the patient, and she agreed to proceed.     Please see her admit H&P for full details of her PMH, PSH, Meds, Allergies and exam on admit.    Operative Course:  Surgery was uncomplicated. EBL from the delivery was 418 mL. Please see her  Section Operative Note for full details regarding her delivery.    Operative Findings:   Findings:   Clear amniotic fluid  Liveborn male infant in cephalic NICOLETTE presentation. Apgars 9 at 1 minute & 9 at 5 minutes. Weight 3969g.  Normal uterus, fallopian tubes, and ovaries.     Postoperative Course:  Her postoperative course was uncomplicated. On POD#2, she was meeting all of her postpartum goals and deemed stable for discharge. She was voiding without difficulty, tolerating a regular diet without nausea and vomiting, her pain was well controlled on oral pain medicines and her lochia was appropriate. Her discharge hemoglobin was 9.2. Her Rh status was positive and Rhogam was not indicated.    Discharge Medications:     Review of your medicines        START taking        Dose / Directions    ferrous sulfate 325 (65 Fe) MG EC tablet  Commonly known as: FE TABS  Used for: S/P       Dose: 325 mg  Take 1 tablet (325 mg) by mouth daily  Quantity: 90 tablet  Refills: 0     oxyCODONE 5 MG tablet  Commonly known as: ROXICODONE  Used for: S/P       Dose: 5 mg  Take 1 tablet (5 mg) by mouth every 6 hours as needed for pain  Quantity: 12 tablet  Refills: 0     senna-docusate 8.6-50 MG tablet  Commonly known as: SENOKOT-S/PERICOLACE  Used for: S/P       Dose: 1 tablet  Take 1 tablet by mouth daily Start after delivery.  Quantity: 100 tablet  Refills: 0            CONTINUE these medicines which may have CHANGED, or have new prescriptions. If we are uncertain of the size of tablets/capsules you have at home, strength may be listed as something that might have changed.        Dose / Directions   * buprenorphine HCl-naloxone HCl 8-2 MG per film  Commonly known as: SUBOXONE  This may have changed: Another medication with the same name was removed. Continue taking this medication, and follow the directions you see here.      Dose: 1 Film  Place 1 Film under the tongue daily  Refills: 0     * buprenorphine HCl-naloxone HCl 4-1 MG per film  Commonly known as: SUBOXONE  This may have changed: Another medication with the same name was removed. Continue taking this medication, and follow the directions you see here.      Dose: 1 Film  Place 1 Film under the tongue daily  Refills: 0     ibuprofen 600 MG tablet  Commonly known as: ADVIL/MOTRIN  This may have changed:   medication strength  when to take this  reasons to take this  additional instructions  Used for: S/P       Dose: 600 mg  Take 1 tablet (600 mg) by mouth every 6 hours as needed for moderate pain Start after delivery  Quantity: 60 tablet  Refills: 0           * This list has 2 medication(s) that are the same as other medications prescribed for you. Read the directions carefully, and ask your doctor or other care provider to  review them with you.                CONTINUE these medicines which have NOT CHANGED        Dose / Directions   acetaminophen 325 MG tablet  Commonly known as: TYLENOL  Used for: S/P       Dose: 650 mg  Take 2 tablets (650 mg) by mouth every 6 hours as needed for mild pain Start after Delivery.  Quantity: 100 tablet  Refills: 0     FLUoxetine 40 MG capsule  Commonly known as: PROzac      Dose: 40 mg  Take 40 mg by mouth daily  Refills: 0     prenatal multivitamin w/iron 27-0.8 MG tablet  Used for: Encounter for supervision of normal first pregnancy in first trimester      Dose: 1 tablet  Take 1 tablet by mouth daily  Quantity: 90 tablet  Refills: 3               Where to get your medicines        These medications were sent to [facility] [address]  [address] [address]      Phone: [phone]   acetaminophen 325 MG tablet  ferrous sulfate 325 (65 Fe) MG EC tablet  ibuprofen 600 MG tablet  oxyCODONE 5 MG tablet  senna-docusate 8.6-50 MG tablet       Discharge/Disposition:  Polina Barrientos was discharged to home in stable condition with the following instructions/medications:  1) Call for temperature > 100.4, bright red vaginal bleeding >1 pad an hour x 2 hours, foul smelling vaginal discharge, pain not controlled by usual oral pain meds, persistent nausea and vomiting not controlled on medications, drainage or redness from incision site  2) She declined for contraception.  3) For feeding she decided to breast.  4) She was instructed to follow-up with her primary OB in 6 weeks for a routine postpartum visit and in 1 week for a mood check.  5) Discharge activity:  No lifting greater than 20 lbs, pushing, pulling, or other strenuous activity for 6 weeks. Pelvic rest for 6 weeks including no sexual intercourse, tampons, or douching. No driving until you can slam on the breaks without pain or while on narcotic pain medications.     Narciso  MD Nikkie MPH  OB/Gyn PGY-4  11/08/22 8:08 AM    I saw and evaluated this patient prior to discharge. I discussed the patient with the resident and agree with plan of care as documented in the resident note.   I personally reviewed vital signs, medications, labs.   I personally spent 10 minutes on discharge activities.  Lianna Beltre MD

## 2022-11-06 NOTE — PROGRESS NOTES
"Labor progress note    S:  Polina is resting comfortably with her epidural. She was able to get a short nap. Partner Herve is at bedside.     O:  Blood pressure 119/59, temperature 98.5  F (36.9  C), temperature source Oral, resp. rate 18, height 1.6 m (5' 3\"), weight 108 kg (238 lb), last menstrual period 2022, SpO2 97 %, not currently breastfeeding.  General appearance: comfortable.  Contractions: Every 2-5 minutes.  seconds duration.  Palpate: moderate.  FHT: Baseline 135 with moderate variability. Accelerations present. Recurrent early decelerations present, occasional variable decelerations.  ROM: clear fluid. Membranes have been ruptured for 8 hours.  Pelvic exam: 4.5/ 50-60%/ post/ soft/ -2  -------------------------------  Pitocin- 14 mu/min.,  Antibiotics- none      A:  IUP @ 40w4d IOL    Fetal Heart rate tracing Category one  GBS- negative  S/p PV miso x1 dose  S/p AROM   Patient Active Problem List   Diagnosis     History of Depression      Suboxone maintenance treatment complicating pregnancy, antepartum, unspecified trimester (H)     Supervision of high risk pregnancy, antepartum     BMI 40.0-44.9, adult (H)     Polyhydramnios in third trimester     Labor and delivery indication for care or intervention         P:  Continue labor induction with Pitocin; titrate per protocol to establish adequate contraction pattern  Encourage frequent position changes to facilitate fetal rotation and descent  Continue to monitor for FHR decelerations; interventions as indicated  Anticipate     I, Marilynn Huang, completed the PFSH and ROS. I then acted as a scribe for CNM for the remainder of the visit. - Marilynn Huang, RN CNM    I agree with the PFSH and ROS as completed by the SNM, except for changes made by me. The remainder of the encounter was performed by me and scribed by the SNM. The scribed note accurately reflects my personal services and decisions made by me.    Opal Bullard, APRN " CNM

## 2022-11-06 NOTE — PLAN OF CARE
Problem: Plan of Care - These are the overarching goals to be used throughout the patient stay.    Goal: Optimal Comfort and Wellbeing  Outcome: Progressing  Intervention: Monitor Pain and Promote Comfort  Recent Flowsheet Documentation  Taken 2022 1355 by Magalis Dave RN  Pain Management Interventions: medication (see MAR)  Taken 2022 0900 by Magalis Dave RN  Pain Management Interventions: medication (see MAR)  Taken 2022 0808 by Magalis Dave RN  Pain Management Interventions: medication (see MAR)   Goal Outcome Evaluation:      Plan of Care Reviewed With: patient  Progressing. VSS. Sats maintained on room air. Pain managed with scheduled tylenol and toradol. Oxycodone given x2 for breakthrough pain. Ambulated to bathroom, tolerated lozano catheter removal. Up in chair. Positive bonding with  observed.

## 2022-11-06 NOTE — PROGRESS NOTES
"Labor progress note    S:  Patient feeling more uncomfortable with contractions. Intermittently feeling pressure and urge to push.     O:  Blood pressure 123/69, temperature 97.4  F (36.3  C), temperature source Oral, resp. rate 16, height 1.6 m (5' 3\"), weight 108 kg (238 lb), last menstrual period 01/25/2022, SpO2 96 %, not currently breastfeeding.  General appearance: uncomfortable with contractions.  Contractions: Every 2-6 minutes. 60-90 seconds duration.  Palpate: moderate.  FHT: Baseline 130 with moderate variability. Accelerations absent. Intermittent variable and decelerations present.   ROM: clear fluid. Membranes have been ruptured for 14 hours.  Pelvic exam: 7/ 90%/ Anterior/ soft/ 0- anterior lip swollen (last check 0057)  -------------------------------  Pitocin- 12 mu/min.,  Antibiotics- none    A:  IUP @ 40w5d IOL active phase   Fetal Heart rate tracing Category two with moderate variability and intermittent variable decelerations present.  GBS- negative  Patient Active Problem List   Diagnosis     History of Depression      Suboxone maintenance treatment complicating pregnancy, antepartum, unspecified trimester (H)     Supervision of high risk pregnancy, antepartum     BMI 40.0-44.9, adult (H)     Polyhydramnios in third trimester     Labor and delivery indication for care or intervention         P:  Anesthesia to bedside to bolus epidural.   Discussed IUPC and amnioinfusion due to continued presence of variable decelerations though currently <50% of contractions (for the last 1hr). She desired to reposition and did not verbally respond to option of IUPC and amnioinfusion. Space provided for patient to consider options. Will reassess within the next 1-2hrs or sooner prn.    OMERO Reed CNM      Addendum at 0218 (it has been >1hr since last assessment d/t daylight savings time).  CNM called to room due to patient feeling rectal pressure. Cervix unchanged. 7/90/0. Patient reporting " "significant pain not relieved by epidural despite bolusing. Offered to have anesthesia come to bedside to evaluate and rebolus or proceed with alternate route of delivery as patient is repeatedly asking us to \"get the baby out\". She would like to proceed with  delivery. FHR tracing remains notable for intermittent variable decelerations, absence of accelerations though moderate variability is present. Patient has not fully met criteria for failure to progress as it has not been a full 4 hours without cervical change though concern present that cervix has made no change in 3 hours. Handoff given to PGY3 Dr. Primitivo Flores re: proceeding with  delivery.    OMERO Reed CNM    "

## 2022-11-06 NOTE — OP NOTE
[facility]  Full Operative Progress Note     Surgery Date:  2022    Surgeon: Lianna Beltre MD    Assistants:   - Primitivo Flores MD - PGY2    Pre-op Diagnosis:   - Intrauterine pregnancy at 40w5d  - BERT  - MDD    Post-op Diagnosis:   - Same, s/p procedure    Procedure:  Primary low-transverse  section with two layer uterine closure via Pfannenstiel incision    Anesthesia: Epidural    EBL: 418 mL    IVF: 1000 mL crystalloid    UOP: 150 mL clear urine at the end of the case    Drains: Cohen Catheter     Specimens: * No specimens in log *    Complications: None apparent    Indications:   Polina Barrientos is a 22 year old  at 40w5d admitted for IOL. She progressed with misoprostol, AROM and pitocin to 7cm. She had suboptimal pain control and requested to move forward with CS.  The risks, benefits, and alternatives of  section were discussed with the patient, and she agreed to proceed.     Findings:   Clear amniotic fluid  Liveborn male infant in cephalic NICOLETTE presentation. Apgars 9 at 1 minute & 9 at 5 minutes. Weight 3969g.  Normal uterus, fallopian tubes, and ovaries.     Procedure Details:   The patient was brought to the OR, where adequate epidural anesthesia was administered.  She was placed in the dorsal supine position with a slight leftward tilt. She was prepped and draped in the usual sterile fashion. A surgical time out was performed. A pfannenstiel skin incision was made with the scalpel, and carried down to the underlying fascia with sharp and blunt dissection. The fascia was incised in the midline, and the incision was extended laterally with digital pressure. The rectus muscles were  in the midline, and the peritoneum was entered bluntly, and the opening was extended with digital pressure. The bladder blade was placed. A transverse hysterotomy was made with the scalpel in the lower uterine segment, and the incision was extended with  digital pressure. The infant was noted to be in the NICOLETTE position, and was delivered atraumatically. The shoulders delivered easily. The cord was doubly clamped and cut, and the infant was handed off to the awaiting nursery staff. The placenta was delivered with gentle traction on the umbilical cord and uterine massage. The uterus was exteriorized and cleared of all clots and debris. Uterine tone was noted to be inadequate with 30 units of pitocin given through the running IV and uterine massage, so rate wsa increased to 60 u.  The hysterotomy was closed with a running locked suture of 0 Vicryl.  The hysterotomy was then imbricated using an 0 Monocryl suture. The hysterotomy was noted to be hemostatic. The posterior cul-de-sac was cleared of all clots and debris. The uterus was returned to the abdomen. The pericolic gutters were cleared of all clots and debris. The hysterotomy was reexamined and noted to be hemostatic. The fascia and rectus muscles were examined and areas of oozing were controlled with electrocautery. The fascia was closed with a running 0 Vicryl suture. The subcutaneous tissue was irrigated and areas of oozing were controlled with electrocautery. The subcutaneous tissue was greater than 2 cm in thickness, and was therefore closed with 3-0 Vicryl. The skin was closed with 4-0 Monocryl and covered with a sterile dressing.    All sponge, needle, and instrument counts were correct. The patient tolerated the procedure well, and was transferred to recovery in stable condition. Dr. Beltre was present and scrubbed for the entirety of the procedure.     Primitivo Flores MD  Obstetrics, Gynecology, and Women's Health  PGY3  2:44 AM 11/06/2022    I was present and scrubbed throughout the procedure,  I agree with the note above  Lianna Beltre MD

## 2022-11-06 NOTE — PROVIDER NOTIFICATION
11/06/22 0100   Provider Notification   Provider Name/Title anesthesia   Method of Notification Phone   Request Evaluate in Person   Notification Reason Pain   Comments   Comments epidural bolus not wokring.     Anesthesiologist paged to bedside due to epidural bolus not covering pain. Bolus given, discomfort improved

## 2022-11-06 NOTE — PROGRESS NOTES
"Labor progress note    S:  Polina continues to rest comfortably with her epidural.      O:  Blood pressure 124/66, temperature 98.2  F (36.8  C), temperature source Oral, resp. rate 16, height 1.6 m (5' 3\"), weight 108 kg (238 lb), last menstrual period 2022, SpO2 97 %, not currently breastfeeding.  General appearance: comfortable.  Contractions: Every 1-5 minutes. 60-90 seconds duration.  Palpate: moderate.  FHT: Baseline 130 with moderate variability. Accelerations absent. Intermittent early decelerations present.  Incomplete tracing due to maternal position. Difficulty to obtain complete tracing secondary to abdominal characteristics.   ROM: clear fluid. Membranes have been ruptured for 10 hours.  Pelvic exam: 6/ 80/ Posterior/ soft/ -2  Pitocin- 18 mu/min.,  Antibiotics- none      A:  IUP @ 40w4d IOL   Fetal Heart rate tracing Category one when traced  GBS- negative  S/p miso x1  S/p AROM  Fetal position: suspect OP    Patient Active Problem List   Diagnosis     History of Depression      Suboxone maintenance treatment complicating pregnancy, antepartum, unspecified trimester (H)     Supervision of high risk pregnancy, antepartum     BMI 40.0-44.9, adult (H)     Polyhydramnios in third trimester     Labor and delivery indication for care or intervention         P:  FSE placement attempted x2; unsuccessful  Continue IOL with pitocin per protocol  Maternal positioning to encourage fetal rotation and descent  Anticipate     I, Marilynn Huang, completed the PFSH and ROS. I then acted as a scribe for CNM for the remainder of the visit. - Marilynn Huang, RN SNM    I agree with the PFSH and ROS as completed by the SNM, except for changes made by me. The remainder of the encounter was performed by me and scribed by the SNM. The scribed note accurately reflects my personal services and decisions made by me.    OMERO ReedM    "

## 2022-11-06 NOTE — ANESTHESIA PROCEDURE NOTES
TAP Procedure Note    Pre-Procedure   Staff -        Anesthesiologist:  Bakari Chiang MD       Resident/Fellow: Jay Howell MD       Performed By: resident       Location: OR       Procedure Start/Stop Times: 11/6/2022 4:22 AM and 11/6/2022 4:42 AM       Pre-Anesthestic Checklist: patient identified, IV checked, site marked, risks and benefits discussed, informed consent, monitors and equipment checked, pre-op evaluation, at physician/surgeon's request and post-op pain management  Timeout:       Correct Patient: Yes        Correct Procedure: Yes        Correct Site: Yes        Correct Position: Yes        Correct Laterality: N/A        Site Marked: N/A  Procedure Documentation  Procedure: TAP       Diagnosis: POST OP PAIN CONTROL       Laterality: bilateral       Patient Position: supine       Patient Prep/Sterile Barriers: sterile gloves, mask       Skin prep: Chloraprep       Needle Type: short bevel       Needle Gauge: 21.        Needle Length (millimeters): 110        Ultrasound guided       1. Ultrasound was used to identify targeted nerve, plexus, vascular marker, or fascial plane and place a needle adjacent to it in real-time.       2. Ultrasound was used to visualize the spread of anesthetic in close proximity to the above referenced structure.       3. A permanent image is entered into the patient's record.       4. The visualized anatomic structures appeared normal.       5. There were no apparent abnormal pathologic findings.    Assessment/Narrative         The placement was negative for: blood aspirated, painful injection and site bleeding       Paresthesias: No.       Bolus given via needle..        Secured via.        Insertion/Infusion Method: Single Shot       Complications: none       Injection made incrementally with aspirations every 5 mL.    Medication(s) Administered   Bupivacaine 0.25% PF (Infiltration) - Infiltration   40 mL - 11/6/2022 4:22:00 AM  Bupivacaine liposome  "(Exparel) 1.3% LA inj susp (Infiltration) - Infiltration   20 mL - 11/6/2022 4:22:00 AM  Medication Administration Time: 11/6/2022 4:22 AM      FOR [facility]) ONLY:   Pain Team Contact information: please page the Pain Team Via DialMyApp. Search \"Pain\". During daytime hours, please page the attending first. At night please page the resident first.    "

## 2022-11-06 NOTE — PROGRESS NOTES
Was called by midwife for patient requesting CS in labor.    Polina Barrientos is a  at 40w5d who presented for IOL. She received misoprostol, AROM, and pitocin. She made change to 7cm, but failed to progress past this for three hours. She had intermittent Cat II tracing throughout the evening. Patient declined IUPC and amnioinfusion. She has suboptimal pain control. Patient is requesting primary CS. Counseled about possibility to attempt to progress her labor with IUPC and amnioinfusion, patient continued to decline. Patient elects to proceed with elective primary CS. The risks, benefits, and alternatives of  section were discussed, including the risks of bleeding, infection, injury to surrounding organs, fetal injury, and remote risks of hysterectomy. She consented to a blood transfusion in the event of a life threatening amount of bleeding. She had time to ask questions and agreed to proceed. Surgical consent was signed.      Primitivo Flores MD  Obstetrics, Gynecology, and Women's Health  PGY3  2:39 AM 2022

## 2022-11-06 NOTE — PROVIDER NOTIFICATION
11/05/22 2348   Provider Notification   Provider Name/Title A Skalisky CNM   Method of Notification At Bedside   Comments   Comments pt is feeling pressue and pushu     Pt requested to be checked again due to feeling more pressure and urge to push.

## 2022-11-06 NOTE — PLAN OF CARE
OR to PACU Transfer Note  Data: Pt to OB PACU at 0445 via cart. PIV infusing with IV pitocin per orders and LR, see MAR without complications, lozano with pale yellow urine to gravity, temp stable, vss, pt does not complain of pain and/or nausea.   Interventions: IV to pump, monitors and alarms on, SCD on.  Response: stable in PACU.  Plan: Patient instructed to notify RN for pain or nausea, routine post op cares, initiate breastfeeding/pumping as soon as patient/infant able.

## 2022-11-06 NOTE — PROGRESS NOTES
"Labor progress note    S:  Called to bedside for patient report of feeling more pressure and desiring SVE.     O:  Blood pressure 115/59, temperature 98.5  F (36.9  C), resp. rate 16, height 1.6 m (5' 3\"), weight 108 kg (238 lb), last menstrual period 01/25/2022, SpO2 97 %, not currently breastfeeding.  General appearance: uncomfortable with contractions.  Contractions: Every 2-4 minutes. 60-90 seconds duration.    FHT: Baseline 130 with moderate variability. Accelerations absent. Intermittent early and variable decelerations traced. Incomplete FHR tracing due to difficulty with EFM secondary to abdominal characteristics.   ROM: clear fluid. Membranes have been ruptured for 12 hours.  Pelvic exam: 7/ 90%/ Anterior/ soft/ 0  -------------------------------  Pitocin- 18 mu/min.,  Antibiotics- none    A:  IUP @ 40w4d IOL and good progress   Fetal Heart rate tracing Category two, due to intermittent variable decelerations.   GBS- negative  S/p PV miso x1 dose  S/p AROM x12 hours  Patient Active Problem List   Diagnosis     History of Depression      Suboxone maintenance treatment complicating pregnancy, antepartum, unspecified trimester (H)     Supervision of high risk pregnancy, antepartum     BMI 40.0-44.9, adult (H)     Polyhydramnios in third trimester     Labor and delivery indication for care or intervention         P:  Encouraged use of epidural bolus button for pain. If unable to get relief after a few attempts of bolus will consider paging anesthesia to reevaluate efficacy of epidural.      Per Category II algorithm continue to observe as decelerations with >50% of contractions have not been present for 1hr and moderate variability is present. Continue closely monitor EFM and consider reattempt of FSE placement if persistent difficulty with continuous EFM tracing.   OMERO Reed CNM    Addendum at 0005  Pitocin turned off. FSE successfully placed. Patient repositioned. Discussed options if Category " II tracing does not resolve and/or cervix does not continue to dilate including possible need to expedite delivery through  delivery. At this time per Category II algorithm: moderate variability is still present and patient is in active phase of labor so able to continue close observation of FHR tracing. Consider amnioinfusion if variable decelerations do not resolve.     OMERO Reed CNM

## 2022-11-06 NOTE — PROVIDER NOTIFICATION
11/05/22 2326   Provider Notification   Provider Name/Title FRANK Bullard CNM   Method of Notification Electronic Page   Request Evaluate in Person   Notification Reason SVE;Patient Request   Comments   Comments pt is feeling more pressure and discomfort.

## 2022-11-07 LAB — HGB BLD-MCNC: 9.2 G/DL (ref 11.7–15.7)

## 2022-11-07 PROCEDURE — 250N000013 HC RX MED GY IP 250 OP 250 PS 637: Performed by: REGISTERED NURSE

## 2022-11-07 PROCEDURE — 250N000013 HC RX MED GY IP 250 OP 250 PS 637: Performed by: STUDENT IN AN ORGANIZED HEALTH CARE EDUCATION/TRAINING PROGRAM

## 2022-11-07 PROCEDURE — 250N000011 HC RX IP 250 OP 636

## 2022-11-07 PROCEDURE — 36415 COLL VENOUS BLD VENIPUNCTURE: CPT | Performed by: STUDENT IN AN ORGANIZED HEALTH CARE EDUCATION/TRAINING PROGRAM

## 2022-11-07 PROCEDURE — 250N000013 HC RX MED GY IP 250 OP 250 PS 637

## 2022-11-07 PROCEDURE — 85018 HEMOGLOBIN: CPT | Performed by: STUDENT IN AN ORGANIZED HEALTH CARE EDUCATION/TRAINING PROGRAM

## 2022-11-07 PROCEDURE — 120N000002 HC R&B MED SURG/OB UMMC

## 2022-11-07 RX ORDER — HYDROCORTISONE 2.5 %
CREAM (GRAM) TOPICAL 2 TIMES DAILY
Status: DISCONTINUED | OUTPATIENT
Start: 2022-11-07 | End: 2022-11-08 | Stop reason: HOSPADM

## 2022-11-07 RX ORDER — IBUPROFEN 600 MG/1
600 TABLET, FILM COATED ORAL EVERY 6 HOURS PRN
Qty: 60 TABLET | Refills: 0 | Status: SHIPPED | OUTPATIENT
Start: 2022-11-07

## 2022-11-07 RX ORDER — ENOXAPARIN SODIUM 100 MG/ML
40 INJECTION SUBCUTANEOUS EVERY 24 HOURS
Status: DISCONTINUED | OUTPATIENT
Start: 2022-11-07 | End: 2022-11-08 | Stop reason: HOSPADM

## 2022-11-07 RX ORDER — AMOXICILLIN 250 MG
1 CAPSULE ORAL DAILY
Qty: 100 TABLET | Refills: 0 | Status: SHIPPED | OUTPATIENT
Start: 2022-11-07

## 2022-11-07 RX ORDER — ACETAMINOPHEN 325 MG/1
650 TABLET ORAL EVERY 6 HOURS PRN
Qty: 100 TABLET | Refills: 0 | Status: SHIPPED | OUTPATIENT
Start: 2022-11-07

## 2022-11-07 RX ORDER — FERROUS SULFATE 325(65) MG
325 TABLET, DELAYED RELEASE (ENTERIC COATED) ORAL DAILY
Qty: 90 TABLET | Refills: 0 | Status: SHIPPED | OUTPATIENT
Start: 2022-11-07

## 2022-11-07 RX ADMIN — ENOXAPARIN SODIUM 40 MG: 40 INJECTION SUBCUTANEOUS at 14:48

## 2022-11-07 RX ADMIN — ACETAMINOPHEN 975 MG: 325 TABLET, FILM COATED ORAL at 02:47

## 2022-11-07 RX ADMIN — SIMETHICONE 80 MG: 80 TABLET, CHEWABLE ORAL at 08:07

## 2022-11-07 RX ADMIN — BUPRENORPHINE AND NALOXONE 1 FILM: 4; 1 FILM BUCCAL; SUBLINGUAL at 23:44

## 2022-11-07 RX ADMIN — IBUPROFEN 800 MG: 800 TABLET, FILM COATED ORAL at 12:02

## 2022-11-07 RX ADMIN — OXYCODONE HYDROCHLORIDE 5 MG: 5 TABLET ORAL at 08:07

## 2022-11-07 RX ADMIN — FLUOXETINE HYDROCHLORIDE 40 MG: 20 CAPSULE ORAL at 08:07

## 2022-11-07 RX ADMIN — SENNOSIDES AND DOCUSATE SODIUM 2 TABLET: 50; 8.6 TABLET ORAL at 08:07

## 2022-11-07 RX ADMIN — OXYCODONE HYDROCHLORIDE 5 MG: 5 TABLET ORAL at 23:44

## 2022-11-07 RX ADMIN — HYDROCORTISONE: 25 CREAM TOPICAL at 04:02

## 2022-11-07 RX ADMIN — IBUPROFEN 800 MG: 800 TABLET, FILM COATED ORAL at 06:37

## 2022-11-07 RX ADMIN — OXYCODONE HYDROCHLORIDE 5 MG: 5 TABLET ORAL at 12:02

## 2022-11-07 RX ADMIN — ACETAMINOPHEN 975 MG: 325 TABLET, FILM COATED ORAL at 20:19

## 2022-11-07 RX ADMIN — IBUPROFEN 800 MG: 800 TABLET, FILM COATED ORAL at 19:42

## 2022-11-07 RX ADMIN — BUPRENORPHINE AND NALOXONE 1 FILM: 8; 2 FILM BUCCAL; SUBLINGUAL at 12:02

## 2022-11-07 RX ADMIN — OXYCODONE HYDROCHLORIDE 5 MG: 5 TABLET ORAL at 04:02

## 2022-11-07 RX ADMIN — ACETAMINOPHEN 975 MG: 325 TABLET, FILM COATED ORAL at 09:06

## 2022-11-07 RX ADMIN — SENNOSIDES AND DOCUSATE SODIUM 2 TABLET: 50; 8.6 TABLET ORAL at 19:43

## 2022-11-07 RX ADMIN — ACETAMINOPHEN 975 MG: 325 TABLET, FILM COATED ORAL at 14:48

## 2022-11-07 RX ADMIN — HYDROCORTISONE: 25 CREAM TOPICAL at 14:48

## 2022-11-07 ASSESSMENT — ACTIVITIES OF DAILY LIVING (ADL)
ADLS_ACUITY_SCORE: 18
ADLS_ACUITY_SCORE: 18
ADLS_ACUITY_SCORE: 22
ADLS_ACUITY_SCORE: 18
ADLS_ACUITY_SCORE: 18
ADLS_ACUITY_SCORE: 22
ADLS_ACUITY_SCORE: 18
ADLS_ACUITY_SCORE: 18
ADLS_ACUITY_SCORE: 22
ADLS_ACUITY_SCORE: 18

## 2022-11-07 NOTE — PLAN OF CARE
Provider notified at 0630 of patients low urinary output. Patient has only had 350 mL output since 1900, 11/6 and an intake of 400. Patient has been continually reminded and encouraged to drink. Patient did not want to initiate IV fluids. Patient went to NICU at 0345, writer brought patients water with (900mL) and asked her to drink at least half. When writer went to pick patient back up at 0600 patient stated she forgot to drink any. Only drank a couple sips on way back to room. Voided 50 mL upon return. Random bladder scan performed with 0 residual. No new orders at this time.    Preeti Quiroga RN

## 2022-11-07 NOTE — PROGRESS NOTES
"Post  Anesthesia Follow Up Note    Patient: Polina Barrientos    Patient location: Postpartum floor.      Procedure(s) Performed:  Procedure(s):   SECTION    Anesthesia type: Epidural and TAP block    Subjective:     Pain is well controlled.     Additional ROS:  She does complain of pruritis at this time. She denies weakness, denies paresthesia, denies difficulties breathing or voiding, denies headache, denies nausea or vomiting. She is able to ambulate and tolerates regular diet.    Objective:  Vitals stable      Last Vitals: /67 (BP Location: Left arm, Patient Position: Semi-Molina's, Cuff Size: Adult Regular)   Pulse 83   Temp 36.5  C (97.7  F) (Oral)   Resp 16   Ht 1.6 m (5' 3\")   Wt 108 kg (238 lb)   LMP 2022   SpO2 98%   Breastfeeding Unknown   BMI 42.16 kg/m      Assessment and plan:   Polina Barrientos is a 22 year old female  POD #1 s/p , and single shot TAP nerve block injections. There is no evidence of adverse side effects associated with spinal and nerve block injections. Pain is well controlled.    Thank you for including us in the care for this patient. Please call with additional questions or concerns.     Emeka Galaviz MD  Anesthesiology Resident CA-1            "

## 2022-11-07 NOTE — PROGRESS NOTES
[facility]   Postpartum Note    Name:  Polina Barrientos  MRN: [id number]    S:  Resting, hard to arouse to answer questions.  Denies significant pain, no n/v, bleeding has been light.  Ambulating and voiding.  Refused new IV overnight.  Baby just admitted to the NICU for withdrawal symptoms.      O:   Patient Vitals for the past 24 hrs:   BP Temp Temp src Pulse Resp SpO2   22 0907 107/67 97.7  F (36.5  C) Oral 83 16 --   22 0251 121/73 98.8  F (37.1  C) Oral 81 16 --   22 2019 100/74 98.4  F (36.9  C) Oral 85 16 --   22 1528 106/67 98.3  F (36.8  C) Oral 83 16 --   22 1408 102/61 98.4  F (36.9  C) Oral -- 16 98 %   22 1058 107/56 98.9  F (37.2  C) Oral -- -- 98 %     Gen:  Resting comfortably, NAD  CV:  Regular rate, well perfused  Pulm:  Breathing room air comfortably  Abd:  Soft, appropriately ttp, non-distended. Fundus firm and below umbilicus, firm and non-tender.  Incision: C/d/i, no surrounding redness or erythema with dressing in place  Ext:  Non-tender, tr LE edema b/l    I/O last 3 completed shifts:  In: 400 [P.O.:400]  Out: 650 [Urine:650]    Hgb:   Hemoglobin   Date Value Ref Range Status   2022 9.2 (L) 11.7 - 15.7 g/dL Final       Assessment/Plan:  22 year old  on POD#1 s/p PLTCS for maternal request    Routine postpartum care:  Pain: Well-controlled with ibuprofen, tylenol, oxycodone prn  Hgb: 10.7> > 9.2.  Patient is asymptomatic from chronic anemia and vitals are reassuring. Will discharge home with PO iron.  Rh: positive  Imm:  Rubella imm      Substance use disorder: PTA suboxone 8mg/4mg  MDD: PTA zoloft    Dispo: Discharge on POD#2-3 pending postop goals.    Keyana Gaston MD, FACOG

## 2022-11-07 NOTE — PLAN OF CARE
Problem: Plan of Care - These are the overarching goals to be used throughout the patient stay.    Goal: Optimal Comfort and Wellbeing  Outcome: Progressing  Intervention: Provide Person-Centered Care  Recent Flowsheet Documentation  Taken 11/6/2022 2470 by Ebony Cooley RN  Trust Relationship/Rapport:   choices provided   care explained   emotional support provided   empathic listening provided   questions answered   Goal Outcome Evaluation:      Plan of Care Reviewed With: patient    VSS. Afebrile. Slow moving but up ad nguyen. Breast feeding well. FOB at bedside and sleeping. Pt attentive to baby's needs. Voided 50cc and post void bladder scan was 44cc.  Encouraged pt to drink more as she does not want IVFs if she can drink. C/o pain and medicated with some relief. Will continue to monitor.

## 2022-11-07 NOTE — PROGRESS NOTES
SW acknowledging consult. SW met with PT in room but PT was too tired to complete the assessment. SW will follow-up with PT this afternoon or following day 11/8/2022.          NARCISO Powers, ISABEL, [facility]  Pediatric Float    Office: [phone]  Email: [email or web address]  After hours and weekend pager: [phone]  *NO LETTER*

## 2022-11-07 NOTE — PLAN OF CARE
Postpartum VSS. Assessments WDL. Pain managed with Tylenol, Toradol/Ibuprofen and Oxy. Simethicone administered for gas relief. Patient complained of abdominal itchiness, on call resident added topical hydrocortisone. Up ad nguyen in room. Still drinking and voiding small amounts, patient needs continual reminders to drink but still has been drinking minimal amounts. Patient does not want IV fluids. Provider notified at 0630 with no new orders at this time. Infant was transferred to NICU this shift, mother emotional about this but accepting. Breastfeeding and beginning to initiate pumping. Mother and infant showing positive signs of attachment. Call light in reach. Continue with plan of care.    Preeti Quiroga RN

## 2022-11-07 NOTE — PLAN OF CARE
Goal Outcome Evaluation: VSS. Postpartum checks WDL. Incisional drsg CDI. Plans to shower later today, instructed on removal. Up independently, voiding without difficulty. Urine output improving but still very concentrated- encouraged pt to continue to focus on hydrating. Pain managed with tylenol, ibuprofen, and oxycodone. Slept most of shift.

## 2022-11-08 VITALS
TEMPERATURE: 98.4 F | WEIGHT: 238 LBS | HEART RATE: 92 BPM | SYSTOLIC BLOOD PRESSURE: 128 MMHG | BODY MASS INDEX: 42.17 KG/M2 | DIASTOLIC BLOOD PRESSURE: 71 MMHG | HEIGHT: 63 IN | OXYGEN SATURATION: 98 % | RESPIRATION RATE: 16 BRPM

## 2022-11-08 PROCEDURE — 250N000013 HC RX MED GY IP 250 OP 250 PS 637: Performed by: STUDENT IN AN ORGANIZED HEALTH CARE EDUCATION/TRAINING PROGRAM

## 2022-11-08 PROCEDURE — 250N000013 HC RX MED GY IP 250 OP 250 PS 637: Performed by: REGISTERED NURSE

## 2022-11-08 RX ORDER — OXYCODONE HYDROCHLORIDE 5 MG/1
5 TABLET ORAL EVERY 6 HOURS PRN
Qty: 12 TABLET | Refills: 0 | Status: SHIPPED | OUTPATIENT
Start: 2022-11-08 | End: 2022-11-11

## 2022-11-08 RX ADMIN — IBUPROFEN 800 MG: 800 TABLET, FILM COATED ORAL at 01:58

## 2022-11-08 RX ADMIN — ACETAMINOPHEN 975 MG: 325 TABLET, FILM COATED ORAL at 11:29

## 2022-11-08 RX ADMIN — FLUOXETINE HYDROCHLORIDE 40 MG: 20 CAPSULE ORAL at 08:50

## 2022-11-08 RX ADMIN — ACETAMINOPHEN 975 MG: 325 TABLET, FILM COATED ORAL at 04:52

## 2022-11-08 RX ADMIN — SENNOSIDES AND DOCUSATE SODIUM 1 TABLET: 50; 8.6 TABLET ORAL at 08:51

## 2022-11-08 RX ADMIN — IBUPROFEN 800 MG: 800 TABLET, FILM COATED ORAL at 08:52

## 2022-11-08 RX ADMIN — BUPRENORPHINE AND NALOXONE 1 FILM: 8; 2 FILM BUCCAL; SUBLINGUAL at 12:06

## 2022-11-08 RX ADMIN — OXYCODONE HYDROCHLORIDE 5 MG: 5 TABLET ORAL at 06:36

## 2022-11-08 ASSESSMENT — ACTIVITIES OF DAILY LIVING (ADL)
ADLS_ACUITY_SCORE: 18

## 2022-11-08 NOTE — PLAN OF CARE
Data: Vital signs and postpartum checks within normal limits - see flow record. pt is eating and drinking normally. Pt is up ambulating in the room and voiding with no problem. Incision is in tact with sterile strips in place and no apparent signs of infection noted. Pt has the inter cloth on the incision and was instructed to take the cloth off once in awhile to air the area. Pt pumped once and got about 50 ml.  Pt have not been to NICU to breastfeed the baby this shift, but plans to go and visit baby after discharge. Complains of incisional pain.  Patient performing self cares independently.  Action: Medicated pt with Tylenol and Ibuprofen for pain control. See MAR. Encouraged pt to pump 2-3 hours or go to NICU to breastfeed to increase breast milk supply. Pt assessed for pain. Reviewed teaching and discharge instructions with pt. Pt was given discharge medications, gift for the baby and a copy of the discharge papers.   Response: Pt declined having any questions. Support person present.   Plan: Pt is discharged today.

## 2022-11-08 NOTE — LACTATION NOTE
"This note was copied from a baby's chart.  D:  I met with Polina. She states she is normally in good health and has no history of breast/chest surgery or trauma.  Her medical record indicates a history of BERT and she is currently stable on Suboxone. She also has history of depression and takes Prozac. This baby is her 2nd child; she is still occasionally nursing her first baby (born 2020). She has already started to pump and is also latching baby when she visits, describing good breastfeeding sessions.   I:  I gave her a folder of introductory materials, reviewed physiology of colostrum and milk production, pumping guidelines, and I gave her a log and encouraged her to use it.  I explained how to access the videos \"Hand Expression\" and \"Maximizing Milk Production\"; as well as other helpful books and websites.  We discussed hands-on pumping techniques and usefulness of a hands-free pumping bra.  We discussed skin to skin holding and how to reach breastfeeding goals.  We talked about medications during breastfeeding.  She verbalized understanding. She has a pumpthrough her insurance company.    A:  Mom has information she needs to initiate her supply.   P:  Will continue to provide lactation support.  Teresa Sherman, RNC, IBCLC             "

## 2022-11-08 NOTE — PLAN OF CARE
"  Problem: Plan of Care - These are the overarching goals to be used throughout the patient stay.    Goal: Plan of Care Review  Description: The Plan of Care Review/Shift note should be completed every shift.  The Outcome Evaluation is a brief statement about your assessment that the patient is improving, declining, or no change.  This information will be displayed automatically on your shift note.  Outcome: Progressing  Flowsheets (Taken 2022)  Plan of Care Reviewed With: patient  Overall Patient Progress: improving  Goal: Patient-Specific Goal (Individualized)  Description: You can add care plan individualizations to a care plan. Examples of Individualization might be:  \"Parent requests to be called daily at 9am for status\", \"I have a hard time hearing out of my right ear\", or \"Do not touch me to wake me up as it startles me\".  Outcome: Progressing  Flowsheets (Taken 2022)  Individualized Care Needs: keep informed of care plan  Patient/Family-Specific Goals (Include Timeframe): choices offered, support for flexibility to visit NICU offered  Goal: Optimal Comfort and Wellbeing  Outcome: Progressing  Intervention: Monitor Pain and Promote Comfort  Recent Flowsheet Documentation  Taken 2022 2344 by Marilynn Carty, RN  Pain Management Interventions: medication (see MAR)     Problem: Postpartum ( Delivery)  Goal: Successful Maternal Role Transition  Outcome: Progressing  Goal: Effective Bowel Elimination  Outcome: Progressing   Goal Outcome Evaluation:      Plan of Care Reviewed With: patient    Overall Patient Progress: improvingOverall Patient Progress: improving     VSS and postpartum assessments WDL.  Up ad nguyen with steady gait and independent with cares. Infant in NICU. Breastfeeding in NICU and pumping for infant.  Pain managed with tylenol, ibuprofen and oxycodone.  Partner present and supportive.  Will continue with postpartum cares and education per plan of care.       "

## 2022-11-08 NOTE — PLAN OF CARE
The patient spent a lot of time in the NICU this evening. Her pain level was an 8/10 when she returned, and with ibuprofen and then tylenol, she reported it as a 4/10. Her vitals are stable. She is voiding. She has interdry in place over her incision. She began pumping this evening. Continue with support and postpartum cares.

## 2022-11-08 NOTE — PROGRESS NOTES
[facility]   Postpartum Note    Name:  Polina Barrientos  MRN: [id number]    S:  Patient doing well, resting in bed, desires discharge today. Denies significant pain, no n/v, bleeding has been light.  Ambulating and voiding. Baby admitted to the NICU for withdrawal symptoms. Planning to breastfeed. Unsure on contraception.    O:   Patient Vitals for the past 24 hrs:   BP Temp Temp src Pulse Resp   22 0907 107/67 97.7  F (36.5  C) Oral 83 16   22 0251 121/73 98.8  F (37.1  C) Oral 81 16   22 2019 100/74 98.4  F (36.9  C) Oral 85 16     Gen:  Resting comfortably, NAD  CV:  Regular rate, well perfused  Pulm:  Breathing room air comfortably  Abd:  Soft, appropriately ttp, non-distended. Fundus firm and below umbilicus, firm and non-tender.  Incision: c/d/i with steristrips, interdry in place  Ext:  Non-tender, tr LE edema b/l    Assessment/Plan:  22 year old  on POD#2 s/p PLTCS for maternal request    Routine postpartum care:  Pain: Well-controlled with ibuprofen, tylenol, oxycodone prn  Hgb: 10.7> > 9.2.  Patient is asymptomatic from chronic anemia and vitals are reassuring. Will discharge home with PO iron.  Rh: positive  Imm:  Rubella imm      Substance use disorder: PTA suboxone 8mg/4mg  MDD: PTA zoloft    Dispo: Discharge today.    Lianna Beltre MD

## 2022-11-08 NOTE — CONSULTS
"[facility]  MATERNAL CHILD HEALTH   INITIAL PSYCHOSOCIAL ASSESSMENT     DATA:     Presenting Information: Mom is a  who delivered an infant boy (Herve Kevin) on 2022 at 40w5d gestation in the setting of . SW was consulted for chemical health history and baby admission to the NICU.    Living Situation: Parents are in a committed relationship but not . Parents live separately. Mother is currently living with her grandmother and 2 year old daughter. Mother shares that she is currently paying rent.     Social Support: Mother identified limited social supports.     Education/Employment: Mother denied current employment.     Insurance: [employer]    Source of Financial Support: Primary support is through [facility]     Mental Health History: Mother confirms that she has a history of mental health challenges but does not endorse details. Mother shares that she is feeling \"ok\" now but harbors some guilt for her baby.       Chemical Health History: Mother shares limited information surrounding her chemical health history. Mother shares that she will have access to opiates due to the  through a prescription by her doctor. SW inquired about having access and shares concerns regarding access to the prescription. Mother denies that she is concerned and shares that she only wants to use the support for pain management. Mother did not share any more details surrounding her chemical use history and appeared slightly withdrawn from further information gathering.     Legal/Child Protection Involvement: SW did not assess legal or child protection involvement as there are no presenting current concerns with safety and due to the nature of rapport building did not want to cause additional harm. SW inquired about county supports and inquired about coordination needs and mother denied having any workers through the [facility] to " coordinate care with.     INTERVENTION:       Chart review    Collaboration with team: RN Sommer Collazo and Charge OSMAR Blancas    Conducted Psychosocial Assessment    Introduction to Maternal Child Health SW role and scope of practice    Validated emotions and provided supportive listening    Provided psychoeducation on  mood and anxiety disorders    Provided resources and referrals    One time parking pass    ASSESSMENT:     Coping: Mother shares that she is comfortable speaking with her medical team surrounding the cares of her baby. Mother appears to be coping but physically struggling with recovery, however, she is open to engaging with social work and requests to meet her assigned  for continue coordination and support.     Assessment of parental risk for PMAD: High risk     Risk Factors: OUD, MDD hx    Resiliency Factors & Strengths: loving, caring, and engaged mother     PLAN:     SW will continue to follow for supportive intervention. SW reviewed access to recovery supports with PT and discussed providers that she can connect with if she needs support within her recovery. PT reports she would like to work alongside her primary  for supportive intervention.     Primary  Assigned:  NARCISO Sun, [facility]  Direct Office Line: [phone]  Pager: [phone]            NARCISO Powers, SW, [facility]  Pediatric Float    Office: [phone]  Email: [email or web address]  After hours and weekend pager: [phone]  *NO LETTER*

## 2022-11-10 NOTE — ANESTHESIA POSTPROCEDURE EVALUATION
Patient: Polina Barrientos    Procedure: Procedure(s):   SECTION       Anesthesia Type:  Epidural    Note:  Disposition: Inpatient   Postop Pain Control: Uneventful            Sign Out: Well controlled pain   PONV: No   Neuro/Psych: Uneventful            Sign Out: Acceptable/Baseline neuro status   Airway/Respiratory: Uneventful            Sign Out: Acceptable/Baseline resp. status   CV/Hemodynamics: Uneventful            Sign Out: Acceptable CV status; No obvious hypovolemia; No obvious fluid overload   Other NRE: NONE   DID A NON-ROUTINE EVENT OCCUR? No           Last vitals:  Vitals Value Taken Time   /54 22   Temp 37.4  C (99.4  F) 22 0446   Pulse 89 22   Resp 17 22 06   SpO2 94 % 22       Electronically Signed By: Lopez Davis MD  2022  6:41 PM

## 2022-11-10 NOTE — ANESTHESIA CARE TRANSFER NOTE
Patient: Polina Barrientos    Procedure: Procedure(s):   SECTION       Diagnosis: * No pre-op diagnosis entered *  Diagnosis Additional Information: No value filed.    Anesthesia Type:   Epidural     Note:      Level of Consciousness: awake  Oxygen Supplementation: room air    Independent Airway: airway patency satisfactory and stable        Patient transferred to: PACU    Handoff Report: Identifed the Patient, Identified the Reponsible Provider, Reviewed the pertinent medical history, Discussed the surgical course, Reviewed Intra-OP anesthesia mangement and issues during anesthesia, Set expectations for post-procedure period and Allowed opportunity for questions and acknowledgement of understanding      Vitals:  Vitals Value Taken Time   /54 22   Temp 37.4  C (99.4  F) 22 0446   Pulse 89 22   Resp 17 22   SpO2 94 % 22       Electronically Signed By: Lopez Davis MD  2022  6:41 PM

## 2023-06-24 ENCOUNTER — HEALTH MAINTENANCE LETTER (OUTPATIENT)
Age: 23
End: 2023-06-24

## 2024-01-04 ENCOUNTER — HOSPITAL ENCOUNTER (EMERGENCY)
Facility: CLINIC | Age: 24
Discharge: HOME | End: 2024-01-04
Attending: EMERGENCY MEDICINE | Admitting: EMERGENCY MEDICINE
Payer: COMMERCIAL

## 2024-01-04 VITALS
OXYGEN SATURATION: 97 % | DIASTOLIC BLOOD PRESSURE: 66 MMHG | SYSTOLIC BLOOD PRESSURE: 109 MMHG | RESPIRATION RATE: 18 BRPM | TEMPERATURE: 98 F | HEART RATE: 74 BPM

## 2024-01-04 DIAGNOSIS — K08.89 PAIN, DENTAL: ICD-10-CM

## 2024-01-04 PROCEDURE — 99283 EMERGENCY DEPT VISIT LOW MDM: CPT | Performed by: EMERGENCY MEDICINE

## 2024-01-04 PROCEDURE — 99284 EMERGENCY DEPT VISIT MOD MDM: CPT | Performed by: EMERGENCY MEDICINE

## 2024-01-04 RX ORDER — AMOXICILLIN 500 MG/1
500 CAPSULE ORAL 3 TIMES DAILY
Qty: 21 CAPSULE | Refills: 0 | Status: SHIPPED | OUTPATIENT
Start: 2024-01-04 | End: 2024-01-11

## 2024-01-04 ASSESSMENT — ACTIVITIES OF DAILY LIVING (ADL): ADLS_ACUITY_SCORE: 33

## 2024-01-05 NOTE — ED TRIAGE NOTES
Patient reports upper right tooth pain for 4 days, radiates to ear and cheek.     Triage Assessment (Adult)       Row Name 01/04/24 1345          Triage Assessment    Airway WDL WDL        Respiratory WDL    Respiratory WDL WDL        Skin Circulation/Temperature WDL    Skin Circulation/Temperature WDL WDL        Cardiac WDL    Cardiac WDL WDL        Peripheral/Neurovascular WDL    Peripheral Neurovascular WDL WDL        Cognitive/Neuro/Behavioral WDL    Cognitive/Neuro/Behavioral WDL WDL

## 2024-08-11 ENCOUNTER — HEALTH MAINTENANCE LETTER (OUTPATIENT)
Age: 24
End: 2024-08-11

## 2024-10-21 NOTE — PLAN OF CARE
Chart reviewed and met w/ patient to initiate discharge planning. CM introduced self and explained role. Patient is from home w/ his wife. Patient has PCP and insurance. Patient reports he was active with [facility] for IV infusions PTA. Patient plans to return home w/ his wife and previous [facility] services at discharge. Patient declined any new needs from this CM at this time.     1:37 PM Perfect serve sent to [facility] liaison to verify that patient is active with services.        10/21/24 1310   Service Assessment   Patient Orientation Alert and Oriented   Cognition Alert   History Provided By Patient   Primary Caregiver Self   Support Systems Spouse/Significant Other   Patient's Healthcare Decision Maker is: Legal Next of Kin   PCP Verified by CM Yes   Prior Functional Level Independent in ADLs/IADLs   Current Functional Level Independent in ADLs/IADLs   Can patient return to prior living arrangement Yes   Ability to make needs known: Good   Family able to assist with home care needs: Yes   Financial Resources Medicare   Community Resources ECF/Home Care  ([facility])   Social/Functional History   Lives With Spouse   Type of Home House   Home Equipment None   ADL Assistance Independent   Ambulation Assistance Independent   Transfer Assistance Independent   Discharge Planning   Type of Residence House   Living Arrangements Spouse/Significant Other   Current Services Prior To Admission Home Care;Home Infusion   Potential Assistance Needed Home Care   DME Ordered? No   Potential Assistance Purchasing Medications No   Type of Home Care Services IV Therapy;Nursing Services   Patient expects to be discharged to: House   Services At/After Discharge   Transition of Care Consult (CM Consult) Home Health   Services At/After Discharge Home Health   Condition of Participation: Discharge Planning   The Patient and/or Patient Representative was provided with a Choice of Provider? Patient   The Patient and/Or Patient Representative agree  Data: Polina Barrientos transferred to [place] [id number] via zoom cart at 0610. Baby transferred via parent's arms.  Action: Receiving unit notified of transfer: Yes. Patient and family notified of room change. Report given to JESSE Fuentes RN at 0520. Belongings sent to receiving unit. Accompanied by Registered Nurse. Oriented patient to surroundings. Call light within reach. ID bands double-checked with receiving RN.  Response: Patient tolerated transfer and is stable.

## 2025-08-17 ENCOUNTER — HEALTH MAINTENANCE LETTER (OUTPATIENT)
Age: 25
End: 2025-08-17

## (undated) DEVICE — STOCKING SLEEVE COMPRESSION CALF LG

## (undated) DEVICE — ESU GROUND PAD UNIVERSAL W/O CORD

## (undated) DEVICE — SU VICRYL 0 CT-1 36" J346H

## (undated) DEVICE — DRSG ABDOMINAL 07 1/2X8" 7197D

## (undated) DEVICE — SU VICRYL 3-0 CTX 36" UND J980H

## (undated) DEVICE — SOL NACL 0.9% IRRIG 1000ML BOTTLE 07138-09

## (undated) DEVICE — PREP CHLORAPREP 26ML TINTED ORANGE  260815

## (undated) DEVICE — GLOVE ESTEEM POWDER FREE SMT 7.0  2D72PT70

## (undated) DEVICE — SOL WATER IRRIG 1000ML BOTTLE 07139-09

## (undated) DEVICE — PACK C-SECTION LF PL15OTA83B

## (undated) DEVICE — SU MONOCRYL 4-0 PS-2 18" UND Y496G

## (undated) DEVICE — SU MONOCRYL 0 CTB-1 36" YB946

## (undated) DEVICE — DRSG STERI STRIP 1/2X4" R1547

## (undated) DEVICE — GLOVE PROTEXIS BLUE W/NEU-THERA 7.5  2D73EB75

## (undated) DEVICE — CATH TRAY FOLEY 16FR BARDEX W/DRAIN BAG STATLOCK 300316A

## (undated) DEVICE — STRAP KNEE/BODY 31143004

## (undated) DEVICE — DRSG TELFA ISLAND 4X10"

## (undated) RX ORDER — PHENYLEPHRINE HCL IN 0.9% NACL 50MG/250ML
PLASTIC BAG, INJECTION (ML) INTRAVENOUS
Status: DISPENSED
Start: 2022-11-06

## (undated) RX ORDER — LIDOCAINE HCL/EPINEPHRINE/PF 2%-1:200K
VIAL (ML) INJECTION
Status: DISPENSED
Start: 2022-11-06

## (undated) RX ORDER — OXYTOCIN/0.9 % SODIUM CHLORIDE 30/500 ML
PLASTIC BAG, INJECTION (ML) INTRAVENOUS
Status: DISPENSED
Start: 2022-11-06

## (undated) RX ORDER — MORPHINE SULFATE 1 MG/ML
INJECTION, SOLUTION EPIDURAL; INTRATHECAL; INTRAVENOUS
Status: DISPENSED
Start: 2022-11-06

## (undated) RX ORDER — BUPIVACAINE HYDROCHLORIDE 2.5 MG/ML
INJECTION, SOLUTION EPIDURAL; INFILTRATION; INTRACAUDAL
Status: DISPENSED
Start: 2022-11-06

## (undated) RX ORDER — KETOROLAC TROMETHAMINE 30 MG/ML
INJECTION, SOLUTION INTRAMUSCULAR; INTRAVENOUS
Status: DISPENSED
Start: 2022-11-06

## (undated) RX ORDER — FENTANYL CITRATE 50 UG/ML
INJECTION, SOLUTION INTRAMUSCULAR; INTRAVENOUS
Status: DISPENSED
Start: 2022-11-06